# Patient Record
Sex: MALE | Race: BLACK OR AFRICAN AMERICAN | URBAN - METROPOLITAN AREA
[De-identification: names, ages, dates, MRNs, and addresses within clinical notes are randomized per-mention and may not be internally consistent; named-entity substitution may affect disease eponyms.]

---

## 2018-01-16 ENCOUNTER — RECORDS - HEALTHEAST (OUTPATIENT)
Dept: LAB | Facility: CLINIC | Age: 48
End: 2018-01-16

## 2018-01-16 LAB
ALBUMIN SERPL-MCNC: 3.9 G/DL (ref 3.5–5)
ALP SERPL-CCNC: 66 U/L (ref 45–120)
ALT SERPL W P-5'-P-CCNC: 23 U/L (ref 0–45)
ANION GAP SERPL CALCULATED.3IONS-SCNC: 12 MMOL/L (ref 5–18)
AST SERPL W P-5'-P-CCNC: 24 U/L (ref 0–40)
BILIRUB SERPL-MCNC: 0.7 MG/DL (ref 0–1)
BUN SERPL-MCNC: 11 MG/DL (ref 8–22)
CALCIUM SERPL-MCNC: 10.5 MG/DL (ref 8.5–10.5)
CHLORIDE BLD-SCNC: 106 MMOL/L (ref 98–107)
CO2 SERPL-SCNC: 25 MMOL/L (ref 22–31)
CREAT SERPL-MCNC: 0.56 MG/DL (ref 0.7–1.3)
ERYTHROCYTE [DISTWIDTH] IN BLOOD BY AUTOMATED COUNT: 15.9 % (ref 11–14.5)
GFR SERPL CREATININE-BSD FRML MDRD: >60 ML/MIN/1.73M2
GLUCOSE BLD-MCNC: 76 MG/DL (ref 70–125)
HCT VFR BLD AUTO: 43.8 % (ref 40–54)
HGB BLD-MCNC: 14.6 G/DL (ref 14–18)
MCH RBC QN AUTO: 28 PG (ref 27–34)
MCHC RBC AUTO-ENTMCNC: 33.3 G/DL (ref 32–36)
MCV RBC AUTO: 84 FL (ref 80–100)
PLATELET # BLD AUTO: 318 THOU/UL (ref 140–440)
PMV BLD AUTO: 11.4 FL (ref 8.5–12.5)
POTASSIUM BLD-SCNC: 3.8 MMOL/L (ref 3.5–5)
PROT SERPL-MCNC: 7.8 G/DL (ref 6–8)
RBC # BLD AUTO: 5.21 MILL/UL (ref 4.4–6.2)
SODIUM SERPL-SCNC: 143 MMOL/L (ref 136–145)
TSH SERPL DL<=0.005 MIU/L-ACNC: 0.76 UIU/ML (ref 0.3–5)
WBC: 7.4 THOU/UL (ref 4–11)

## 2018-01-17 LAB — 25(OH)D3 SERPL-MCNC: 15.5 NG/ML (ref 30–80)

## 2018-03-30 ENCOUNTER — HOSPITAL ENCOUNTER (INPATIENT)
Facility: CLINIC | Age: 48
LOS: 17 days | Discharge: HOME | DRG: 885 | End: 2018-04-18
Attending: EMERGENCY MEDICINE | Admitting: PSYCHIATRY & NEUROLOGY
Payer: COMMERCIAL

## 2018-03-30 ENCOUNTER — MEDICAL CORRESPONDENCE (OUTPATIENT)
Dept: HEALTH INFORMATION MANAGEMENT | Facility: CLINIC | Age: 48
End: 2018-03-30

## 2018-03-30 DIAGNOSIS — G47.00 INSOMNIA, UNSPECIFIED TYPE: ICD-10-CM

## 2018-03-30 DIAGNOSIS — R46.89 AGGRESSIVE BEHAVIOR: ICD-10-CM

## 2018-03-30 DIAGNOSIS — M06.9 RHEUMATOID ARTHRITIS, INVOLVING UNSPECIFIED SITE, UNSPECIFIED RHEUMATOID FACTOR PRESENCE: ICD-10-CM

## 2018-03-30 DIAGNOSIS — K59.00 CONSTIPATION, UNSPECIFIED CONSTIPATION TYPE: ICD-10-CM

## 2018-03-30 DIAGNOSIS — F29 PSYCHOSIS, UNSPECIFIED PSYCHOSIS TYPE (H): ICD-10-CM

## 2018-03-30 DIAGNOSIS — I95.9 HYPOTENSION, UNSPECIFIED HYPOTENSION TYPE: ICD-10-CM

## 2018-03-30 DIAGNOSIS — F31.81 BIPOLAR 2 DISORDER (H): Primary | ICD-10-CM

## 2018-03-30 DIAGNOSIS — R32 URINARY INCONTINENCE, UNSPECIFIED TYPE: ICD-10-CM

## 2018-03-30 LAB
ALBUMIN SERPL-MCNC: 4.1 G/DL (ref 3.4–5)
ALP SERPL-CCNC: 60 U/L (ref 40–150)
ALT SERPL W P-5'-P-CCNC: 22 U/L (ref 0–70)
ANION GAP SERPL CALCULATED.3IONS-SCNC: 6 MMOL/L (ref 3–14)
AST SERPL W P-5'-P-CCNC: 10 U/L (ref 0–45)
BASOPHILS # BLD AUTO: 0 10E9/L (ref 0–0.2)
BASOPHILS NFR BLD AUTO: 0.2 %
BILIRUB SERPL-MCNC: 0.4 MG/DL (ref 0.2–1.3)
BUN SERPL-MCNC: 15 MG/DL (ref 7–30)
CALCIUM SERPL-MCNC: 9.4 MG/DL (ref 8.5–10.1)
CHLORIDE SERPL-SCNC: 110 MMOL/L (ref 94–109)
CO2 SERPL-SCNC: 26 MMOL/L (ref 20–32)
CREAT SERPL-MCNC: 0.35 MG/DL (ref 0.66–1.25)
DIFFERENTIAL METHOD BLD: NORMAL
EOSINOPHIL # BLD AUTO: 0 10E9/L (ref 0–0.7)
EOSINOPHIL NFR BLD AUTO: 0.8 %
ERYTHROCYTE [DISTWIDTH] IN BLOOD BY AUTOMATED COUNT: 14.6 % (ref 10–15)
GFR SERPL CREATININE-BSD FRML MDRD: >90 ML/MIN/1.7M2
GLUCOSE SERPL-MCNC: 92 MG/DL (ref 70–99)
HCT VFR BLD AUTO: 41.3 % (ref 40–53)
HGB BLD-MCNC: 14.2 G/DL (ref 13.3–17.7)
IMM GRANULOCYTES # BLD: 0 10E9/L (ref 0–0.4)
IMM GRANULOCYTES NFR BLD: 0.2 %
LYMPHOCYTES # BLD AUTO: 1.5 10E9/L (ref 0.8–5.3)
LYMPHOCYTES NFR BLD AUTO: 30.1 %
MCH RBC QN AUTO: 29.2 PG (ref 26.5–33)
MCHC RBC AUTO-ENTMCNC: 34.4 G/DL (ref 31.5–36.5)
MCV RBC AUTO: 85 FL (ref 78–100)
MONOCYTES # BLD AUTO: 0.4 10E9/L (ref 0–1.3)
MONOCYTES NFR BLD AUTO: 7.8 %
NEUTROPHILS # BLD AUTO: 3 10E9/L (ref 1.6–8.3)
NEUTROPHILS NFR BLD AUTO: 60.9 %
NRBC # BLD AUTO: 0 10*3/UL
NRBC BLD AUTO-RTO: 0 /100
PLATELET # BLD AUTO: 225 10E9/L (ref 150–450)
POTASSIUM SERPL-SCNC: 3.9 MMOL/L (ref 3.4–5.3)
PROT SERPL-MCNC: 7.7 G/DL (ref 6.8–8.8)
RBC # BLD AUTO: 4.87 10E12/L (ref 4.4–5.9)
SODIUM SERPL-SCNC: 142 MMOL/L (ref 133–144)
TSH SERPL DL<=0.005 MIU/L-ACNC: 0.79 MU/L (ref 0.4–4)
WBC # BLD AUTO: 4.9 10E9/L (ref 4–11)

## 2018-03-30 PROCEDURE — 84443 ASSAY THYROID STIM HORMONE: CPT | Performed by: EMERGENCY MEDICINE

## 2018-03-30 PROCEDURE — 96360 HYDRATION IV INFUSION INIT: CPT | Performed by: EMERGENCY MEDICINE

## 2018-03-30 PROCEDURE — 25000132 ZZH RX MED GY IP 250 OP 250 PS 637: Performed by: EMERGENCY MEDICINE

## 2018-03-30 PROCEDURE — 80053 COMPREHEN METABOLIC PANEL: CPT | Performed by: EMERGENCY MEDICINE

## 2018-03-30 PROCEDURE — 90791 PSYCH DIAGNOSTIC EVALUATION: CPT

## 2018-03-30 PROCEDURE — 99285 EMERGENCY DEPT VISIT HI MDM: CPT | Mod: 25 | Performed by: EMERGENCY MEDICINE

## 2018-03-30 PROCEDURE — 85025 COMPLETE CBC W/AUTO DIFF WBC: CPT | Performed by: EMERGENCY MEDICINE

## 2018-03-30 PROCEDURE — 99285 EMERGENCY DEPT VISIT HI MDM: CPT | Mod: Z6 | Performed by: EMERGENCY MEDICINE

## 2018-03-30 PROCEDURE — 25000128 H RX IP 250 OP 636: Performed by: EMERGENCY MEDICINE

## 2018-03-30 RX ORDER — TAMSULOSIN HYDROCHLORIDE 0.4 MG/1
0.4 CAPSULE ORAL DAILY
Status: ON HOLD | COMMUNITY
End: 2018-04-17

## 2018-03-30 RX ORDER — FUROSEMIDE 20 MG/1
10 TABLET ORAL
Status: DISCONTINUED | OUTPATIENT
Start: 2018-04-02 | End: 2018-04-18 | Stop reason: HOSPADM

## 2018-03-30 RX ORDER — TAMSULOSIN HYDROCHLORIDE 0.4 MG/1
0.4 CAPSULE ORAL DAILY
Status: DISCONTINUED | OUTPATIENT
Start: 2018-03-31 | End: 2018-04-08

## 2018-03-30 RX ORDER — TRAZODONE HYDROCHLORIDE 100 MG/1
100 TABLET ORAL AT BEDTIME
Status: DISCONTINUED | OUTPATIENT
Start: 2018-03-30 | End: 2018-04-01

## 2018-03-30 RX ORDER — BISACODYL 5 MG/1
5 TABLET, DELAYED RELEASE ORAL DAILY PRN
Status: ON HOLD | COMMUNITY
End: 2018-04-17

## 2018-03-30 RX ORDER — AMOXICILLIN 250 MG
2 CAPSULE ORAL 2 TIMES DAILY
Status: ON HOLD | COMMUNITY
End: 2018-04-18

## 2018-03-30 RX ORDER — OLANZAPINE 10 MG/1
10 TABLET, ORALLY DISINTEGRATING ORAL ONCE
Status: COMPLETED | OUTPATIENT
Start: 2018-03-30 | End: 2018-03-30

## 2018-03-30 RX ORDER — POLYETHYLENE GLYCOL 3350 17 G/17G
17 POWDER, FOR SOLUTION ORAL 2 TIMES DAILY
Status: ON HOLD | COMMUNITY
End: 2018-04-17

## 2018-03-30 RX ORDER — SULFASALAZINE 500 MG/1
1000 TABLET ORAL 2 TIMES DAILY
Status: DISCONTINUED | OUTPATIENT
Start: 2018-03-30 | End: 2018-04-08

## 2018-03-30 RX ORDER — SENNOSIDES 8.6 MG
2 TABLET ORAL 2 TIMES DAILY
Status: DISCONTINUED | OUTPATIENT
Start: 2018-03-30 | End: 2018-04-04

## 2018-03-30 RX ORDER — ERGOCALCIFEROL 1.25 MG/1
50000 CAPSULE, LIQUID FILLED ORAL
Status: ON HOLD | COMMUNITY
End: 2018-04-18

## 2018-03-30 RX ORDER — SULFASALAZINE 500 MG/1
1000 TABLET ORAL 2 TIMES DAILY
Status: ON HOLD | COMMUNITY
End: 2018-04-17

## 2018-03-30 RX ORDER — OLANZAPINE 20 MG/1
20 TABLET, ORALLY DISINTEGRATING ORAL AT BEDTIME
Status: DISCONTINUED | OUTPATIENT
Start: 2018-03-30 | End: 2018-04-04

## 2018-03-30 RX ORDER — MULTIPLE VITAMINS W/ MINERALS TAB 9MG-400MCG
1 TAB ORAL ONCE
Status: DISCONTINUED | OUTPATIENT
Start: 2018-03-30 | End: 2018-04-18 | Stop reason: HOSPADM

## 2018-03-30 RX ADMIN — OLANZAPINE 10 MG: 10 TABLET, ORALLY DISINTEGRATING ORAL at 11:57

## 2018-03-30 RX ADMIN — SODIUM CHLORIDE 1000 ML: 9 INJECTION, SOLUTION INTRAVENOUS at 16:04

## 2018-03-30 ASSESSMENT — ENCOUNTER SYMPTOMS
SLEEP DISTURBANCE: 1
AGITATION: 1
HYPERACTIVE: 1

## 2018-03-30 NOTE — PHARMACY-ADMISSION MEDICATION HISTORY
Admission medication history interview status for the 3/30/2018 admission is complete. See Epic admission navigator for allergy information, pharmacy, prior to admission medications and immunization status.     Medication history interview sources:  MAR from [facility] [phone]    Changes made to PTA medication list (reason)  Added: all meds listed below  Deleted: none  Changed: none    Additional medication history information (including reliability of information, actions taken by pharmacist):  -Updated medication list from MAR provided by care facility.   -Unsure why patient has not had the Lasix for the past week. The order was not discontinued on the MAR.     Prior to Admission medications    Medication Sig Last Dose Taking? Auth Provider   TRAZODONE HCL PO Take 100 mg by mouth At Bedtime 3/29/2018 at 2000 Yes Unknown, Entered By History   polyethylene glycol (MIRALAX/GLYCOLAX) Packet Take 17 g by mouth 2 times daily 3/30/2018 at 0800 Yes Unknown, Entered By History   senna-docusate (SENOKOT-S;PERICOLACE) 8.6-50 MG per tablet Take 2 tablets by mouth 2 times daily 3/30/2018 at 0800 Yes Unknown, Entered By History   Acetaminophen (TYLENOL PO) Take 650 mg by mouth every 4 hours as needed for mild pain or fever 3/28/2018 at 0514 Yes Unknown, Entered By History   bisacodyl (DULCOLAX) 5 MG EC tablet Take 5 mg by mouth daily as needed for constipation 3/29/2018 at 2209 Yes Unknown, Entered By History   Furosemide (LASIX PO) Take 10 mg by mouth every 48 hours 3/22/2018 at 2347 Yes Unknown, Entered By History   magnesium citrate solution Take 296 mLs by mouth daily as needed for constipation 3/28/2018 at 0515 Yes Unknown, Entered By History   magnesium hydroxide (MILK OF MAGNESIA) 400 MG/5ML suspension Take 30 mLs by mouth daily as needed for constipation 3/28/2018 at 0515 Yes Unknown, Entered By History   OXYCODONE HCL PO Take 5 mg by mouth every 6 hours as needed (pain) 3/28/2018 at 0515 Yes Unknown,  Entered By History   Multiple Vitamins-Minerals (CERTAVITE/ANTIOXIDANTS PO) Take 1 tablet by mouth daily 3/30/2018 at 0800 Yes Unknown, Entered By History   Psyllium (METAMUCIL FIBER SINGLES PO) Take 17 g by mouth daily 3/30/2018 at 0800 Yes Unknown, Entered By History   OLANZAPINE PO Take 20 mg by mouth daily 3/30/2018 at 0800 Yes Unknown, Entered By History   tamsulosin (FLOMAX) 0.4 MG capsule Take 0.4 mg by mouth daily 3/30/2018 at 0800 Yes Unknown, Entered By History   sulfaSALAzine (AZULFIDINE) 500 MG tablet Take 1,000 mg by mouth 2 times daily 3/30/2018 at 0800 Yes Unknown, Entered By History   vitamin D (ERGOCALCIFEROL) 06119 UNIT capsule Take 50,000 Units by mouth Twice weekly on Tuesdays and Saturdays 3/27/2018 at 0800 Yes Unknown, Entered By History     Medication history completed by:   Madeline Bullock, PharmD, BCPS

## 2018-03-30 NOTE — IP AVS SNAPSHOT
MRN:[id number]                      After Visit Summary   3/30/2018    Ronald Dalal    MRN: [id number]           Thank you!     Thank you for choosing [facility] for your care. Our goal is always to provide you with excellent care.        Patient Information     Date Of Birth          1970        Designated Caregiver       Most Recent Value    Caregiver    Name of designated caregiver ?    Phone number of caregiver ?    Caregiver address ?      About your hospital stay     You were admitted on:  April 1, 2018 [facility]    You were discharged on:  April 18, 2018       Who to Call     For medical emergencies, please call 911.  For non-urgent questions about your medical care, please call your primary care provider or clinic, None          Attending Provider     Provider Specialty    Brody Rothman MD Emergency Medicine    Kristian, Demetrius MADRIGAL MD Emergency Medicine    Wale, Coreen Butler MD Emergency Medicine    Inga, Fran Lowe MD Emergency Medicine    Brian, Mt Hanna MD Psychiatry       Primary Care Provider    Rachana Sewell MD      Further instructions from your care team        Behavioral Discharge Planning and Instructions      Summary:  You were admitted on 3/30/2018  due to aggression.  You were treated by Dr. Mt Nayak MD and discharged on 04/18/2018 from [address] to niece's house.    [facility] [place]  [address]  [address]  [place]  [phone]      Principal Diagnosis:   Bipolar disorder Type 1 versus 2  Developmental delay    Health Care Follow-up Appointments:   PCP:  Dr Linda Izquierdo, April 24th at 1:20 pm  [address],   [place] [address]   Phone: [phone]    :  [facility]  [phone]  [facility] Coordinator:  Shari Mckee  [phone]  Attend all scheduled appointments with your outpatient providers. Call at least 24 hours in advance if you need to  reschedule an appointment to ensure continued access to your outpatient providers.   Major Treatments, Procedures and Findings:  You were provided with: a psychiatric assessment, assessed for medical stability and medication evaluation and/or management    Symptoms to Report: feeling more aggressive, increased confusion, losing more sleep, mood getting worse or thoughts of suicide    Early warning signs can include: increased depression or anxiety sleep disturbances increased thoughts or behaviors of suicide or self-harm  increased unusual thinking, such as paranoia or hearing voices    Safety and Wellness:  Take all medicines as directed.  Make no changes unless your doctor suggests them.      Follow treatment recommendations.  Refrain from alcohol and non-prescribed drugs.  If there is a concern for safety, call 911.    Resources:   Crisis Intervention: 358.521.2529 or 041-260-4103 (TTY: 159.436.3632).  Call anytime for help.  [place] on Mental Illness (www.[place].radha.org): 845.498.8216 or 437-027-9721.  Suicide Awareness Voices of Education (SAVE) (www.save.org): 500-879-SLMY (1594)  National Suicide Prevention Line (www.mentalhealthmn.org): 347-174-CSHE (4784)  Mental Health Consumer/Survivor Network of [place] (www.mhcsn.net): 384.679.5964 or 374-760-2846  Mental Health Association of [place] (www.mentalhealth.org): 808.193.4251 or 341-336-4967  Self- Management and Recovery Training., SMART-- Toll free: 530.466.5470  www.[employer].org  [facility] Crisis (COPE) Response - Adult [phone]    The treatment team has appreciated the opportunity to work with you.     If you have any questions or concerns our unit number is [phone].      Pending Results     Date and Time Order Name Status Description    4/17/2018 1604 EKG 12-lead, complete Preliminary             Statement of Approval     Ordered          04/18/18 0916  I have reviewed and agree with all the recommendations and orders detailed in this  "document.  EFFECTIVE NOW     Approved and electronically signed by:  Mt Torres MD             Admission Information     Date & Time Provider Department Dept. Phone    3/30/2018 Mt Torres MD [address] [phone]      Your Vitals Were     Blood Pressure Pulse Temperature Respirations Weight Pulse Oximetry    120/82 100 96.6  F (35.9  C) (Tympanic) 15 65.5 kg (144 lb 4.8 oz) 98%      MyChart Information     [employer] lets you send messages to your doctor, view your test results, renew your prescriptions, schedule appointments and more. To sign up, go to [email or web address] . Click on \"Log in\" on the left side of the screen, which will take you to the Welcome page. Then click on \"Sign up Now\" on the right side of the page.     You will be asked to enter the access code listed below, as well as some personal information. Please follow the directions to create your username and password.     Your access code is: [email or web address]  Expires: 2018 12:57 PM     Your access code will  in 90 days. If you need help or a new code, please call your [facility] or [phone].        Care EveryWhere ID     This is your Care EveryWhere ID. This could be used by other organizations to access your [facility] medical records  [id number]        Equal Access to Services     [facility] AH: Hadii vero major hadhaleyo Sosaraali, waaxda luqadaha, qaybta kaalmada aderichardyada, daya butler . So [facility] [phone].    ATENCIÓN: Si habla español, tiene a quiñones disposición servicios gratuitos de asistencia lingüística. Llame al [phone].    We comply with applicable federal civil rights laws and [place] laws. We do not discriminate on the basis of race, color, national origin, age, disability, sex, sexual orientation, or gender identity.               Review of your medicines      START taking        Dose / Directions    gabapentin 250 MG/5ML solution   Commonly known as:  NEURONTIN "   Used for:  Bipolar 2 disorder (H)        Dose:  200 mg   Take 4 mLs (200 mg) by mouth 3 times daily   Quantity:  470 mL   Refills:  3       OLANZapine 1 mg/ml suspension   Commonly known as:  zyPREXA   Replaces:  OLANZAPINE PO        Dose:  20 mg   Take 20 mLs (20 mg) by mouth At Bedtime   Quantity:  600 mL   Refills:  3       sennosides 8.8 MG/5ML syrup   Commonly known as:  SENOKOT   Used for:  Constipation, unspecified constipation type        Dose:  10 mL   Take 10 mLs by mouth daily as needed for constipation   Quantity:  236 mL   Refills:  0       sulfaSALAzine 50 mg/mL Susp   Commonly known as:  AZULFIDINE   Used for:  Rheumatoid arthritis, involving unspecified site, unspecified rheumatoid factor presence (H)   Replaces:  sulfaSALAzine 500 MG tablet        Dose:  1000 mg   Take 20 mLs (1,000 mg) by mouth 2 times daily   Quantity:  1200 mL   Refills:  1       tamsulosin 80 mcg/mL Susp   Commonly known as:  FLOMAX   Used for:  Urinary incontinence, unspecified type   Replaces:  FLOMAX 0.4 MG capsule        Dose:  400 mcg   Take 5 mLs (400 mcg) by mouth daily   Quantity:  200 mL   Refills:  1       valproic acid 250 MG/5ML Soln syrup   Commonly known as:  DEPAKENE   Used for:  Bipolar 2 disorder (H)        Dose:  500 mg   Take 10 mLs (500 mg) by mouth At Bedtime   Quantity:  473 mL   Refills:  3         CONTINUE these medicines which may have CHANGED, or have new prescriptions. If we are uncertain of the size of tablets/capsules you have at home, strength may be listed as something that might have changed.        Dose / Directions    furosemide 20 MG tablet   Commonly known as:  LASIX   This may have changed:  when to take this   Used for:  Urinary incontinence, unspecified type        Dose:  10 mg   Start taking on:  4/20/2018   Take 0.5 tablets (10 mg) by mouth Every Mon, Wed, Fri Morning   Quantity:  15 tablet   Refills:  0         CONTINUE these medicines which have NOT CHANGED        Dose / Directions     TYLENOL PO        Dose:  650 mg   Take 650 mg by mouth every 4 hours as needed for mild pain or fever   Refills:  0         STOP taking     bisacodyl 5 MG EC tablet   Commonly known as:  DULCOLAX           CERTAVITE/ANTIOXIDANTS PO           FLOMAX 0.4 MG capsule   Generic drug:  tamsulosin   Replaced by:  tamsulosin 80 mcg/mL Susp           magnesium citrate solution           magnesium hydroxide 400 MG/5ML suspension   Commonly known as:  MILK OF MAGNESIA           METAMUCIL FIBER SINGLES PO           OLANZAPINE PO   Replaced by:  OLANZapine 1 mg/ml suspension           OXYCODONE HCL PO           polyethylene glycol Packet   Commonly known as:  MIRALAX/GLYCOLAX           senna-docusate 8.6-50 MG per tablet   Commonly known as:  SENOKOT-S;PERICOLACE           sulfaSALAzine 500 MG tablet   Commonly known as:  AZULFIDINE   Replaced by:  sulfaSALAzine 50 mg/mL Susp           TRAZODONE HCL PO           vitamin D 50795 UNIT capsule   Commonly known as:  ERGOCALCIFEROL                Where to get your medicines      These medications were sent to [facility] [place], [place] - [address]  [address] [address]     Phone:  [phone]     furosemide 20 MG tablet    gabapentin 250 MG/5ML solution    OLANZapine 1 mg/ml suspension    sennosides 8.8 MG/5ML syrup    sulfaSALAzine 50 mg/mL Susp    tamsulosin 80 mcg/mL Susp    valproic acid 250 MG/5ML Soln syrup                Protect others around you: Learn how to safely use, store and throw away your medicines at www.disposemymeds.org.             Medication List: This is a list of all your medications and when to take them. Check marks below indicate your daily home schedule. Keep this list as a reference.      Medications           Morning Afternoon Evening Bedtime As Needed    furosemide 20 MG tablet   Commonly known as:  LASIX   Take 0.5 tablets (10 mg) by mouth Every Mon, Wed, Fri Morning   Start taking on:  4/20/2018   Last  time this was given:  10 mg on 4/18/2018  9:18 AM            Monday, Wednesday and Friday                       gabapentin 250 MG/5ML solution   Commonly known as:  NEURONTIN   Take 4 mLs (200 mg) by mouth 3 times daily   Last time this was given:  200 mg on 4/18/2018  9:19 AM                   2PM                      OLANZapine 1 mg/ml suspension   Commonly known as:  zyPREXA   Take 20 mLs (20 mg) by mouth At Bedtime   Last time this was given:  20 mg on 4/17/2018  7:53 PM                                   sennosides 8.8 MG/5ML syrup   Commonly known as:  SENOKOT   Take 10 mLs by mouth daily as needed for constipation   Last time this was given:  10 mLs on 4/17/2018  8:37 AM                            Daily for constipation.       sulfaSALAzine 50 mg/mL Susp   Commonly known as:  AZULFIDINE   Take 20 mLs (1,000 mg) by mouth 2 times daily   Last time this was given:  1,000 mg on 4/18/2018  9:19 AM                                      tamsulosin 80 mcg/mL Susp   Commonly known as:  FLOMAX   Take 5 mLs (400 mcg) by mouth daily   Last time this was given:  400 mcg on 4/18/2018  9:19 AM                                   TYLENOL PO   Take 650 mg by mouth every 4 hours as needed for mild pain or fever                                   valproic acid 250 MG/5ML Soln syrup   Commonly known as:  DEPAKENE   Take 10 mLs (500 mg) by mouth At Bedtime   Last time this was given:  500 mg on 4/17/2018  7:53 PM

## 2018-03-30 NOTE — IP AVS SNAPSHOT
[facility]    [address]    [place] [address]    Phone:  [phone]                                       After Visit Summary   3/30/2018    Ronald Dalal    MRN: [id number]           After Visit Summary Signature Page     I have received my discharge instructions, and my questions have been answered. I have discussed any challenges I see with this plan with the nurse or doctor.    ..........................................................................................................................................  Patient/Patient Representative Signature      ..........................................................................................................................................  Patient Representative Print Name and Relationship to Patient    ..................................................               ................................................  Date                                            Time    ..........................................................................................................................................  Reviewed by Signature/Title    ...................................................              ..............................................  Date                                                            Time

## 2018-03-30 NOTE — ED NOTES
ED to Behavioral Floor Handoff    SITUATION  Ronald Dalal is a 47 year old male who speaks Data Unavailable and lives in a nursing home with others The patient arrived in the ED by ambulance from emergency room with a complaint of Aggressive Behavior (is aggressive with other residents at [place])  .The patient's current symptoms started/worsened 3 day(s) ago and during this time the symptoms have   .   In the ED, pt was diagnosed with   Final diagnoses:   Psychosis, unspecified psychosis type   Aggressive behavior        Initial vitals were: BP: 136/72  Pulse: 114  Temp: 97.6  F (36.4  C)  Resp: 16  SpO2: 98 %   --------  Is the patient diabetic? No   If yes, last blood glucose? --     If yes, was this treated in the ED? --  --------  Is the patient inebriated (ETOH) No or Impaired on other substances? No  MSSA done? N/A  Last MSSA score: --    Were withdrawal symptoms treated? N/A  Does the patient have a seizure history? No. If yes, date of most recent seizure--  --------  Is the patient patient experiencing suicidal ideation? denies current or recent suicidal ideation     Homicidal ideation? reports current or recent homicidal ideation or behaviors including aggressive towards others    Self-injurious behavior/urges? denies current or recent self injurious behavior or ideation.  ------  Was pt aggressive in the ED Yes  Was a code called Yes  Is the pt now cooperative? Yes  -------  Meds given in ED:   Medications   0.9% sodium chloride BOLUS (1,000 mLs Intravenous New Bag 3/30/18 1604)   OLANZapine zydis (zyPREXA) ODT tab 10 mg (10 mg Oral Given 3/30/18 1157)      Family present during ED course? No  Family currently present? No    BACKGROUND  Does the patient have a cognitive impairment or developmental disability? Yes  Allergies: No Known Allergies.   Social demographics are   Social History     Social History     Marital status: N/A     Spouse name: N/A     Number of children: N/A     Years of  education: N/A     Social History Main Topics     Smoking status: Not on file     Smokeless tobacco: Not on file     Alcohol use Not on file     Drug use: Not on file     Sexual activity: Not on file     Other Topics Concern     Not on file     Social History Narrative        ASSESSMENT  Labs results   Labs Ordered and Resulted from Time of ED Arrival Up to the Time of Departure from the ED   COMPREHENSIVE METABOLIC PANEL - Abnormal; Notable for the following:        Result Value    Chloride 110 (*)     Creatinine 0.35 (*)     All other components within normal limits   CBC WITH PLATELETS DIFFERENTIAL   TSH WITH FREE T4 REFLEX   DRUG ABUSE SCREEN 6 CHEM DEP URINE ([facility])      Imaging Studies: No results found for this or any previous visit (from the past 24 hour(s)).   Most recent vital signs BP (!) 87/60  Pulse 78  Temp 97.6  F (36.4  C) (Oral)  Resp 16  SpO2 98%   Abnormal labs/tests/findings requiring intervention:---   Pain control: pt had none  Nausea control: pt had none    RECOMMENDATION  Are any infection precautions needed (MRSA, VRE, etc.)? No If yes, what infection? --  ---  Does the patient have mobility issues? independently. If yes, what device does the pt use? ---  ---  Is patient on 72 hour hold or commitment? Yes If on 72 hour hold, have hold and rights been given to patient? Yes  Are admitting orders written if after 10 p.m. ?No  Tasks needing to be completed:---     Morales Serrano   ascom-- [id number]  Pt is currently sedated, receiving IV fluids, needs assist with toilet-wears adult pull up   [phone] [facility]   [phone] [facility]
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
## 2018-03-30 NOTE — ED NOTES
Bed: ED10  Expected date: 3/30/18  Expected time: 10:40 AM  Means of arrival:   Comments:  [id number] 48yo M behav

## 2018-03-31 PROCEDURE — 25000132 ZZH RX MED GY IP 250 OP 250 PS 637: Performed by: INTERNAL MEDICINE

## 2018-03-31 RX ADMIN — SENNOSIDES 2 TABLET: 8.6 TABLET, FILM COATED ORAL at 20:00

## 2018-03-31 RX ADMIN — OLANZAPINE 20 MG: 20 TABLET, ORALLY DISINTEGRATING ORAL at 22:11

## 2018-03-31 RX ADMIN — SULFASALAZINE 1000 MG: 500 TABLET ORAL at 19:59

## 2018-03-31 RX ADMIN — TRAZODONE HYDROCHLORIDE 100 MG: 100 TABLET ORAL at 22:11

## 2018-03-31 RX ADMIN — SULFASALAZINE 500 MG: 500 TABLET ORAL at 10:33

## 2018-03-31 NOTE — ED NOTES
4:00 PM  Patient was signed out to me pending bed placement.  He slept most of the day and had no complaints.  At the time of this dictation, we were still awaiting bed placement on mental health for his psychosis.     Demetrius Townsend MD  03/31/18 7874

## 2018-03-31 NOTE — ED NOTES
No bed available tonight.  Holding orders for medications and diet written.  BP 91/53  Pulse 78  Temp 97.6  F (36.4  C) (Oral)  Resp 16  SpO2 98%  Labs/Imaging    Results for orders placed or performed during the hospital encounter of 03/30/18 (from the past 24 hour(s))   CBC with platelets differential   Result Value Ref Range    WBC 4.9 4.0 - 11.0 10e9/L    RBC Count 4.87 4.4 - 5.9 10e12/L    Hemoglobin 14.2 13.3 - 17.7 g/dL    Hematocrit 41.3 40.0 - 53.0 %    MCV 85 78 - 100 fl    MCH 29.2 26.5 - 33.0 pg    MCHC 34.4 31.5 - 36.5 g/dL    RDW 14.6 10.0 - 15.0 %    Platelet Count 225 150 - 450 10e9/L    Diff Method Automated Method     % Neutrophils 60.9 %    % Lymphocytes 30.1 %    % Monocytes 7.8 %    % Eosinophils 0.8 %    % Basophils 0.2 %    % Immature Granulocytes 0.2 %    Nucleated RBCs 0 0 /100    Absolute Neutrophil 3.0 1.6 - 8.3 10e9/L    Absolute Lymphocytes 1.5 0.8 - 5.3 10e9/L    Absolute Monocytes 0.4 0.0 - 1.3 10e9/L    Absolute Eosinophils 0.0 0.0 - 0.7 10e9/L    Absolute Basophils 0.0 0.0 - 0.2 10e9/L    Abs Immature Granulocytes 0.0 0 - 0.4 10e9/L    Absolute Nucleated RBC 0.0    Comprehensive metabolic panel   Result Value Ref Range    Sodium 142 133 - 144 mmol/L    Potassium 3.9 3.4 - 5.3 mmol/L    Chloride 110 (H) 94 - 109 mmol/L    Carbon Dioxide 26 20 - 32 mmol/L    Anion Gap 6 3 - 14 mmol/L    Glucose 92 70 - 99 mg/dL    Urea Nitrogen 15 7 - 30 mg/dL    Creatinine 0.35 (L) 0.66 - 1.25 mg/dL    GFR Estimate >90 >60 mL/min/1.7m2    GFR Estimate If Black >90 >60 mL/min/1.7m2    Calcium 9.4 8.5 - 10.1 mg/dL    Bilirubin Total 0.4 0.2 - 1.3 mg/dL    Albumin 4.1 3.4 - 5.0 g/dL    Protein Total 7.7 6.8 - 8.8 g/dL    Alkaline Phosphatase 60 40 - 150 U/L    ALT 22 0 - 70 U/L    AST 10 0 - 45 U/L   TSH with free T4 reflex   Result Value Ref Range    TSH 0.79 0.40 - 4.00 mU/L          Brendan Berger MD  03/30/18 7369
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
## 2018-04-01 PROBLEM — F29 PSYCHOSIS (H): Status: ACTIVE | Noted: 2018-04-01

## 2018-04-01 LAB
AMPHETAMINES UR QL SCN: NEGATIVE
BARBITURATES UR QL: NEGATIVE
BENZODIAZ UR QL: NEGATIVE
CANNABINOIDS UR QL SCN: NEGATIVE
COCAINE UR QL: NEGATIVE
ETHANOL UR QL SCN: NEGATIVE
OPIATES UR QL SCN: NEGATIVE

## 2018-04-01 PROCEDURE — 80307 DRUG TEST PRSMV CHEM ANLYZR: CPT | Performed by: EMERGENCY MEDICINE

## 2018-04-01 PROCEDURE — 80320 DRUG SCREEN QUANTALCOHOLS: CPT | Performed by: EMERGENCY MEDICINE

## 2018-04-01 PROCEDURE — 25000132 ZZH RX MED GY IP 250 OP 250 PS 637: Performed by: EMERGENCY MEDICINE

## 2018-04-01 PROCEDURE — 12400005 ZZH R&B MH CRITICAL SENIOR/ADOLESCENT

## 2018-04-01 PROCEDURE — 25000132 ZZH RX MED GY IP 250 OP 250 PS 637: Performed by: PSYCHIATRY & NEUROLOGY

## 2018-04-01 PROCEDURE — 25000132 ZZH RX MED GY IP 250 OP 250 PS 637: Performed by: INTERNAL MEDICINE

## 2018-04-01 RX ORDER — GABAPENTIN 250 MG/5ML
200 SOLUTION ORAL 3 TIMES DAILY
Status: DISCONTINUED | OUTPATIENT
Start: 2018-04-01 | End: 2018-04-18 | Stop reason: HOSPADM

## 2018-04-01 RX ORDER — OLANZAPINE 10 MG/1
10 TABLET ORAL
Status: DISCONTINUED | OUTPATIENT
Start: 2018-04-01 | End: 2018-04-04

## 2018-04-01 RX ORDER — ACETAMINOPHEN 325 MG/1
650 TABLET ORAL EVERY 4 HOURS PRN
Status: DISCONTINUED | OUTPATIENT
Start: 2018-04-01 | End: 2018-04-08

## 2018-04-01 RX ORDER — OLANZAPINE 10 MG/2ML
10 INJECTION, POWDER, FOR SOLUTION INTRAMUSCULAR
Status: DISCONTINUED | OUTPATIENT
Start: 2018-04-01 | End: 2018-04-18 | Stop reason: HOSPADM

## 2018-04-01 RX ORDER — HYDROXYZINE HYDROCHLORIDE 25 MG/1
25 TABLET, FILM COATED ORAL EVERY 4 HOURS PRN
Status: DISCONTINUED | OUTPATIENT
Start: 2018-04-01 | End: 2018-04-08

## 2018-04-01 RX ADMIN — OLANZAPINE 20 MG: 20 TABLET, ORALLY DISINTEGRATING ORAL at 21:07

## 2018-04-01 RX ADMIN — VALPROIC ACID 500 MG: 250 SOLUTION ORAL at 22:03

## 2018-04-01 RX ADMIN — OLANZAPINE 10 MG: 10 TABLET, FILM COATED ORAL at 08:26

## 2018-04-01 RX ADMIN — GABAPENTIN 200 MG: 250 SUSPENSION ORAL at 21:41

## 2018-04-01 RX ADMIN — GABAPENTIN 200 MG: 250 SUSPENSION ORAL at 13:39

## 2018-04-01 ASSESSMENT — ACTIVITIES OF DAILY LIVING (ADL)
SWALLOWING: 0 - SWALLOWS FOODS/LIQUIDS WITHOUT DIFFICULTY
RETIRED_COMMUNICATION: 3-->UNABLE TO UNDERSTAND OR SPEAK (NOT RELATED TO LANGUAGE BARRIER)
TOILETING: 2-->ASSISTIVE PERSON
EATING: 2 - ASSISTIVE PERSON
GROOMING: HANDWASHING;PROMPTS;WITH ASSISTANCE
BATHING: 2 - ASSISTIVE PERSON
SWALLOWING: 0-->SWALLOWS FOODS/LIQUIDS WITHOUT DIFFICULTY
AMBULATION: 0 - INDEPENDENT
ORAL_HYGIENE: WITH ASSISTANCE
BATHING: 2-->ASSISTIVE PERSON
COGNITION: 2 - DIFFICULTY WITH ORGANIZING THOUGHTS
DRESS: SCRUBS (BEHAVIORAL HEALTH);WITH ASSISTANCE
TRANSFERRING: 2-->ASSISTIVE PERSON
RETIRED_EATING: 2-->ASSISTIVE PERSON
DRESS: SCRUBS (BEHAVIORAL HEALTH);WITH ASSISTANCE
AMBULATION: 2-->ASSISTIVE PERSON
TRANSFERRING: 0 - INDEPENDENT
GROOMING: HANDWASHING;PROMPTS;WITH ASSISTANCE
TOILETING: 2 - ASSISTIVE PERSON
COMMUNICATION: 3 - UNABLE TO UNDERSTAND OR SPEAK (NOT RELATED TO LANGUAGE BARRIER)
ORAL_HYGIENE: WITH ASSISTANCE
DRESS: 2-->ASSISTIVE PERSON
DRESS: 2 - ASSISTIVE PERSON
FALL_HISTORY_WITHIN_LAST_SIX_MONTHS: NO

## 2018-04-01 NOTE — ED NOTES
Pt has been staying in the room the whole shift, resting in bed, calm, cooperative, took scheduled meds without problems, ate 100% supper time. Pt does not speak, but nods/shakes head when asked questions. Pt toileted every 2-4 hrs. Pt wearing pull ups. SBA - ambulation

## 2018-04-01 NOTE — PROGRESS NOTES
Pt has been physically cued to toilet. Pt sat on the toilet. No micturition or defecation this time.

## 2018-04-01 NOTE — PROGRESS NOTES
Pt arrived on the unit via 3 security guards.  He tried to elope in the elevator.  He was agitated when he arrived on the unit.  He was escorted to his room immediately.   Admission needs to be completed.  Reported to day shift.

## 2018-04-01 NOTE — PLAN OF CARE
Problem: Patient Care Overview  Goal: Plan of Care/Patient Progress Review  Outcome: Therapy, progress toward functional goals is gradual  Illness Management Recovery model:  Self-Reflection & Planning.    Assessed patient's progress completing forms related to Illness Management Recovery (including Personal Plan of Care, Adult Coping Plan, and My Support and Coping Plan) and assisted as needed.    Encouraged patient to continue to consider triggers, wellness strategies, early warning signs, feedback from others, actions to take to prevent relapse, and coping strategies as part of a plan to remain well after leaving the hospital.

## 2018-04-01 NOTE — PROGRESS NOTES
04/01/18 0925   Patient Belongings   Did you bring any home meds/supplements to the hospital?  No   Patient Belongings clothing;shoes   Disposition of Belongings Locker   Belongings Search Yes   Clothing Search Yes   Second Staff Alejandro Villarreal (night PA's)     LOCKER: jeans, collared shirt, shoes with laces, karina bear    A               Admission:  I am responsible for any personal items that are not sent to the safe or pharmacy.  [facility] is not responsible for loss, theft or damage of any property in my possession.    Signature:  _________________________________ Date: _______  Time: _____                                              Staff Signature:  ____________________________ Date: ________  Time: _____      2nd Staff person, if patient is unable/unwilling to sign:    Signature: ________________________________ Date: ________  Time: _____     Discharge:  [facility] has returned all of my personal belongings:    Signature: _________________________________ Date: ________  Time: _____                                          Staff Signature:  ____________________________ Date: ________  Time: _____
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
## 2018-04-01 NOTE — PROGRESS NOTES
Initial Psychosocial Assessment    I have reviewed the chart, met with the patient, and developed Care Plan.  Information for assessment was obtained from: chart review only. Patient is not able to communicate.      Presenting Problem: Patient is on a 72 hour hold  Patient is a 47 year old  male sent in by his nursing home for increasing agitation, aggression and insomnia. Patient has been discharged from his nursing home because they cannot manage him. He has been agitated during the day, difficult to redirect, yelling and singing at the top of his voice day and night, and disrupting other residents.     History of Mental Health and Chemical Dependency:  Patient has a history of unspecified psychosis and intellectual disability. This is is first mental health hospitalization. No CD history.    Family Description (Constellation, Family Psychiatric History):  Patient is single and has never been . Patient lived with his mother up until 12/2016 when he got sick and moved into the nursing home. His nursing home reports that family rarely visit him and they take up collections for incidental expenses. Per chart, patient's mother recently went to a TCU.    Significant Life Events (Illness, Abuse, Trauma, Death):  Unable to assess    Living Situation:  Patient has been living at [facility] but was discharged due to aggression and unmanageable behavior. His niece reports that he may be able to live with his sisters if another facility will not take him.    Educational Background:  Unable to assess    Occupational History:  Patient has never worked due to his developmental disabiity    Financial Status:  Unable to assess    Legal Issues:  Patient is his own legal guardian. His niece reports that his mother and sister make decisions for him.    Ethnic/Cultural Issues:  Single  male with developmental disability.    Spiritual Orientation:  Unable to  assess     Service History:  None    Social Functioning (organization, interests):  Unable to assess    Current Treatment Providers are:  Medication Management:  Selena Marquez MS, ACP, [address], [address], [place]  [address], [phone], Fax:  [phone]  Therapy: Jessica Ribeiro, PhD  Family contacts:  Niece Brittany Dalal, [phone] and sister Candice Rivera, [phone]  Nursing Home staff:  [phone]    Social Service Assessment/Plan:  Patient would benefit from a county . Living arrangements will need to be explored as well as guardianship. Coordinate care with family.

## 2018-04-01 NOTE — ED PROVIDER NOTES
Patient was signed out to me at shift change.  Please see the notes from the initial provider for initial ED presentation and course.  Plan at this time is for the patient to be admitted to mental health bed because of agitation and aggression at his group home.  He is here from a group home and has a developmental delay.  He is on a hold.  Unfortunately, due to staffing needs, a bed is not available for him at this time.  Patient has remained calm and cooperative throughout the shift this evening.  He did not require any as needed medications for agitation.     Coreen Echevarria MD  03/31/18 3464

## 2018-04-01 NOTE — PROGRESS NOTES
At the start of this shift ,0700, Pt was being directed by to staff to BR. Pt needed physical cues to use the toilet. Pt voided continently. This staff received report. Pt from [facility] who have discharged him,don't want him back. Pt has been aggressive to staff and other Pts. Pt had just come on to the unit with 3 security officers and staff. Pt was agitated. Upon meeting Pt after report, Pt set up at breakfast and was eating well. Pt needed food opened to eat it otherwise would not eat it. Pt non verbal. Pt offered Oral meds (in priority). Pt took oral Zyprexa, then refused all other meds. Offered a pill in pudding,but Pt picked it out. Pt has been ambulating about the unit. Pt seems steady on his feet.Pt is on 1:1 status. Pt just now dozing in his chair.Pt vital signs OK. Spoke with [facility], I had questions about Pt meds, They had discharged Pt a day ago and had no info for me. Pusued our Pharmacy staff who rec onciled Pt meds in ER and this was helpful. Will cont to monitor.

## 2018-04-01 NOTE — PROGRESS NOTES
Dr Boyle has seen Pt.Aware of Pt potential for Physical aggression. Attempted to perform Admit with Pt. Pt unable to answer questions. Pt awake ,sitting in lounge. Pt cont on 1:1 status.

## 2018-04-02 LAB
DEPRECATED CALCIDIOL+CALCIFEROL SERPL-MC: 14 UG/L (ref 20–75)
FOLATE SERPL-MCNC: 11.4 NG/ML
VIT B12 SERPL-MCNC: 435 PG/ML (ref 193–986)

## 2018-04-02 PROCEDURE — 82607 VITAMIN B-12: CPT | Performed by: PSYCHIATRY & NEUROLOGY

## 2018-04-02 PROCEDURE — 82746 ASSAY OF FOLIC ACID SERUM: CPT | Performed by: PSYCHIATRY & NEUROLOGY

## 2018-04-02 PROCEDURE — 99232 SBSQ HOSP IP/OBS MODERATE 35: CPT | Performed by: PSYCHIATRY & NEUROLOGY

## 2018-04-02 PROCEDURE — 25000132 ZZH RX MED GY IP 250 OP 250 PS 637: Performed by: PSYCHIATRY & NEUROLOGY

## 2018-04-02 PROCEDURE — 12400005 ZZH R&B MH CRITICAL SENIOR/ADOLESCENT

## 2018-04-02 PROCEDURE — 25000132 ZZH RX MED GY IP 250 OP 250 PS 637: Performed by: INTERNAL MEDICINE

## 2018-04-02 PROCEDURE — 82306 VITAMIN D 25 HYDROXY: CPT | Performed by: PSYCHIATRY & NEUROLOGY

## 2018-04-02 ASSESSMENT — ACTIVITIES OF DAILY LIVING (ADL)
GROOMING: PROMPTS
ORAL_HYGIENE: PROMPTS
ORAL_HYGIENE: PROMPTS
DRESS: SCRUBS (BEHAVIORAL HEALTH)
DRESS: SCRUBS (BEHAVIORAL HEALTH)
GROOMING: PROMPTS

## 2018-04-02 NOTE — PLAN OF CARE
Problem: Patient Care Overview  Goal: Team Discussion  Team Plan:   BEHAVIORAL TEAM DISCUSSION    Participants: Nabor Abraham RN, CLARA Bassett  Progress: New admit  Continued Stay Criteria/Rationale: Aggressive behavior  Medical/Physical: See medical notes  Precautions:   Behavioral Orders   Procedures     Code 1 - Restrict to Unit     Elopement precautions     Routine Programming     As clinically indicated     Status 15     Every 15 minutes.     Status Individual Observation     Order Specific Question:   CONTINUOUS 24 hours / day     Answer:   Distance and Exceptions     Order Specific Question:   Distance     Answer:   5 feet     Order Specific Question:   Exceptions     Answer:   none     Suicide precautions     Patients on Suicide Precautions should have a Combination Diet ordered that includes a Diet selection(s) AND a Behavioral Tray selection for Safe Tray - with utensils, or Safe Tray - NO utensils       Plan:The plan is to assess the patient for mental health and medication needs.  The patient will be prescribed   medications to treat the identified symptoms.  Upon discharge the patient will be referred to services as   appropriate based on the assessment.   Rationale for change in precautions or plan: N/A      Problem: General Plan of Care (Inpatient Behavioral)  Goal: Team Discussion  Team Plan:   BEHAVIORAL TEAM DISCUSSION    Participants: Nabor Abraham RN, Melida Morales, [facility]  Progress: New admit  Continued Stay Criteria/Rationale: Aggressive behavior  Medical/Physical: See medical notes  Precautions:   Behavioral Orders   Procedures     Code 1 - Restrict to Unit     Elopement precautions     Routine Programming     As clinically indicated     Status 15     Every 15 minutes.     Status Individual Observation     Order Specific Question:   CONTINUOUS 24 hours / day     Answer:   Distance and Exceptions     Order Specific Question:   Distance     Answer:   5 feet      Order Specific Question:   Exceptions     Answer:   none     Suicide precautions     Patients on Suicide Precautions should have a Combination Diet ordered that includes a Diet selection(s) AND a Behavioral Tray selection for Safe Tray - with utensils, or Safe Tray - NO utensils       Plan:The plan is to assess the patient for mental health and medication needs.  The patient will be prescribed   medications to treat the identified symptoms.  Upon discharge the patient will be referred to services as   appropriate based on the assessment.   Rationale for change in precautions or plan: N/A

## 2018-04-02 NOTE — PLAN OF CARE
Problem: Behavioral Disturbance  Goal: Behavioral Disturbance  Pt able to maintain a reality oriented conversation.    Pt responds appropriately to redirection for aggressive, intrusive behavior.    Pt attends groups without difficulty, responding appropriately to the activities of the group.    Pt completes ADL's each shift.    Pt's intake of fluids and nutrition is adequate.  Signs and symptoms of listed problems will be absent or manageable by discharge or transition of care.  Outcome: No Change    Pt presents confused and distractible thinking. Poor concentration exhibited. Pt oriented to self and able to nod when asked  are you Ronald Dalal.  Pt disoriented to place, time, date and situation. Affect blunted and tense. Mood is calm but Pt gets irritable at times of giving personal care. Appearance disheveled and untidy. Pt s speech muted for majority of shift. Only verbalized speech was one-word answers or short statements. One word or short statements would exhibited pressured speech. Statements/words included  No! ,  I don t want to do that! , and  I won t take that!  Pt seen to communicate through head nodding.   Pt seen to eat 100% of dinner. Pt also seen to eat multiple snacks and consume multiple small cups of fluids. Pt seen to go to sleep at approximately 2200. Pt took medications that are solution or ODT. Pt refused oral tabs.   Pt showed no signs of acute physical injury. Pt unable to rate anxiety or depression. Writer unable to assess Pt s SI/SIB/HI due to muted speech; however, Pt showed no signs of SI/SIB/HI.   Pt showed no signs of elopement during this shift.   Pt seen visible in the milieu for majority of shift. Pt seen to walk/wonder in the halls sporadically.   Pt had small BM X1. Pt was incontinent X2. Pt used toilet for voiding X1. Pt offered toileting multiple times while in room.   Pt seen to become irritable, tense and angry upon command for cares to Pt. Pt responded well to staff giving  him space, asking him to follow commands in a gentle and respectful manner, and re-approaching.

## 2018-04-02 NOTE — PROGRESS NOTES
[facility], [place]   Psychiatric Progress Note        Interim History:   The patient's care was discussed with the treatment team during the daily team meeting and/or staff's chart notes were reviewed.  Staff report patient didn't take his morning medications. Is not oriented.     The patient answers questions incorrectly (according to staff who witnessed him eating breakfast, etc.) Does not answer questions regarding mood, suicidality, or psychosis. Poor eye contact.          Medications:       gabapentin  200 mg Oral TID     valproic acid  500 mg Oral At Bedtime     OLANZapine zydis  20 mg Oral At Bedtime     sulfaSALAzine  1,000 mg Oral BID     furosemide  10 mg Oral Q Mon Wed Fri AM     sennosides  2 tablet Oral BID     multivitamin, therapeutic with minerals  1 tablet Oral Once     tamsulosin  0.4 mg Oral Daily          Allergies:   No Known Allergies       Labs:     Recent Results (from the past 24 hour(s))   Drug abuse screen 6 urine (tox)    Collection Time: 04/01/18  5:00 PM   Result Value Ref Range    Amphetamine Qual Urine Negative NEG^Negative    Barbiturates Qual Urine Negative NEG^Negative    Benzodiazepine Qual Urine Negative NEG^Negative    Cannabinoids Qual Urine Negative NEG^Negative    Cocaine Qual Urine Negative NEG^Negative    Ethanol Qual Urine Negative NEG^Negative    Opiates Qualitative Urine Negative NEG^Negative          Psychiatric Examination:     /68  Pulse 81  Temp 97.6  F (36.4  C) (Tympanic)  Resp 16  Wt 62.1 kg (136 lb 14.4 oz)  SpO2 99%  Weight is 136 lbs 14.4 oz  There is no height or weight on file to calculate BMI.  Orthostatic Vitals       Most Recent      Sitting Orthostatic /65 04/02 0807    Sitting Orthostatic Pulse (bpm) 82 04/02 0807    Standing Orthostatic /72 04/02 0807    Standing Orthostatic Pulse (bpm) 86 04/02 0807            Appearance: awake  Attitude:  uncooperative  Eye Contact:  poor   Mood:  unable to  assess  Affect:  intensity is flat  Speech:  paucity of speech  Psychomotor Behavior:  physical retardation  Thought Process:  evidence of thought blocking present  Associations:  no loose associations  Thought Content:  no evidence of suicidal ideation or homicidal ideation and patient appears to be responding to internal stimuli  Insight:  limited  Judgement:  limited  Oriented to:  person  Attention Span and Concentration:  poor  Recent and Remote Memory:  limited    Clinical Global Impressions  First:  Considering your total clinical experience with this particular patient population, how severe are the patient's symptoms at this time?: 7 (04/01/18 1140)  Compared to the patient's condition at the START of treatment, this patient's condition is:: 4 (04/01/18 1140)  Most recent:  Considering your total clinical experience with this particular patient population, how severe are the patient's symptoms at this time?: 7 (04/01/18 1140)  Compared to the patient's condition at the START of treatment, this patient's condition is:: 4 (04/01/18 1140)    # Pain Assessment:  Current Pain Score 3/31/2018   Patient currently in pain? COREY   Pain score (0-10) -   - Ronald is unable to participate in a collaborative plan for pain management due to current level of psychosis.   - Please see the plan for pain management as documented above           Precautions:     Behavioral Orders   Procedures     Code 1 - Restrict to Unit     Elopement precautions     Routine Programming     As clinically indicated     Status 15     Every 15 minutes.     Status Individual Observation     Order Specific Question:   CONTINUOUS 24 hours / day     Answer:   Distance and Exceptions     Order Specific Question:   Distance     Answer:   5 feet     Order Specific Question:   Exceptions     Answer:   none     Suicide precautions     Patients on Suicide Precautions should have a Combination Diet ordered that includes a Diet selection(s) AND a Behavioral  Tray selection for Safe Tray - with utensils, or Safe Tray - NO utensils            DIagnoses:   Bipolar disorder Type 1 versus 2         Plan:     1) Continue Zyprexa.   2) VPA 500mg Qday started yesterday.   3) Gabapentin 200mg TID started yesterday.   4) Patient admitted on a 72-hour hold. Will file petition due to current lack of capacity and guardian.   5) Continue SIO precautions.   6) Disposition to be determined. Patient to show improvement in terms of psychosis and aggression prior to discharge.

## 2018-04-02 NOTE — PROGRESS NOTES
Faxed petition for commitment to [facility] at [phone].    Received call from Dr. Gallardo at [facility] pre-petition screening who will be out tomorrow morning to meet with pt.

## 2018-04-02 NOTE — PROGRESS NOTES
"   04/02/18 1441   Behavioral Health   Hallucinations denies / not responding to hallucinations   Thinking other (see comment)  (COREY)   Orientation other (see comment)  (COREY)   Memory other (see comment)  (COREY)   Insight other (see comment)  (COREY)   Judgement impaired   Eye Contact rolling   Affect blunted, flat   Mood anxious;irritable   Physical Appearance/Attire untidy   Hygiene neglected grooming - unclean body, hair, teeth   Suicidality other (see comments)  (Denied)   1. Wish to be Dead No   2. Non-Specific Active Suicidal Thoughts  No   Change in Protective Factors? Yes (see comments)  (Removal from group home)   Enviromental Risk Factors None   Self Injury other (see comment)  (Denied)   Elopement (None observed)   Activity withdrawn;restless   Speech other (see comments)  (Simple sentences)   Medication Sensitivity no stated side effects;no observed side effects   Psychomotor / Gait balanced;steady   Activities of Daily Living   Hygiene/Grooming prompts   Oral Hygiene prompts   Dress scrubs (behavioral health)   Room Organization prompts     Pt was visible aimlessly pacing the community areas and sleeping in chairs for most of the shift. Pt did not engaged in any goal-directed behavior. Pt also did not engage anyone in a meaningful way. When asked direct questions, pt often averted his eyes and only shaked head \"no\" or \"yes.\" When pressed to complete a task, whether it was to take a rest from pacing or stand up to take an orthostatic blood pressure, pt was observed to shout comments such as, \"I don't want to sit!\" or \"No, I want to stay right here!\" Pt fell asleep multiple times in community areas and adamantly refused to retire to his room despite multiple verbal prompts. Pt was able to feed self and breakfast and dinner and seemed to have an adequate appetite. At one point, pt became incontinent of bowel and bladder while ambulating in the hallway. Pt went to his room and cleaned and changed himself with " continuous verbal prompting. Taye EID 4/2/18

## 2018-04-03 PROCEDURE — 25000132 ZZH RX MED GY IP 250 OP 250 PS 637: Performed by: PSYCHIATRY & NEUROLOGY

## 2018-04-03 PROCEDURE — 25000132 ZZH RX MED GY IP 250 OP 250 PS 637: Performed by: INTERNAL MEDICINE

## 2018-04-03 PROCEDURE — 12400005 ZZH R&B MH CRITICAL SENIOR/ADOLESCENT

## 2018-04-03 PROCEDURE — 99233 SBSQ HOSP IP/OBS HIGH 50: CPT | Performed by: PSYCHIATRY & NEUROLOGY

## 2018-04-03 RX ADMIN — VALPROIC ACID 500 MG: 250 SOLUTION ORAL at 00:00

## 2018-04-03 RX ADMIN — GABAPENTIN 200 MG: 250 SUSPENSION ORAL at 17:58

## 2018-04-03 RX ADMIN — GABAPENTIN 200 MG: 250 SUSPENSION ORAL at 08:39

## 2018-04-03 RX ADMIN — GABAPENTIN 200 MG: 250 SUSPENSION ORAL at 13:50

## 2018-04-03 RX ADMIN — VALPROIC ACID 500 MG: 250 SOLUTION ORAL at 19:14

## 2018-04-03 RX ADMIN — OLANZAPINE 20 MG: 20 TABLET, ORALLY DISINTEGRATING ORAL at 19:08

## 2018-04-03 ASSESSMENT — ACTIVITIES OF DAILY LIVING (ADL)
ORAL_HYGIENE: PROMPTS
GROOMING: PROMPTS
LAUNDRY: UNABLE TO COMPLETE
ORAL_HYGIENE: PROMPTS
HYGIENE/GROOMING: PROMPTS
DRESS: SCRUBS (BEHAVIORAL HEALTH)
DRESS: SCRUBS (BEHAVIORAL HEALTH)

## 2018-04-03 NOTE — PROGRESS NOTES
Refused all meds. Slept in chair,  did make a few statements but otherwise dozed in chair. Appetite good for dinner. No aggressive behavior.

## 2018-04-03 NOTE — PROGRESS NOTES
"[facility], [place]   Psychiatric Progress Note        Interim History:   The patient's care was discussed with the treatment team during the daily team meeting and/or staff's chart notes were reviewed.  Staff report patient has been taking liquid formulations of medications.     The patient says \"I don't want no more\" about pill forms of medications. Shakes head when asked about problems with mood. Denies problems with sleep or appetite. Denies SI? Doesn't respond to questions about pain.     Did speak to patient's niece Brittany (patient nodded permission). Says that the patient usually doesn't talk much. His mother was taking care of him prior to entering rehab facility. Was supposed to transition to a group home but this was put off. Says that he will get angry but doesn't harm himself or others as far as she is aware.          Medications:       gabapentin  200 mg Oral TID     valproic acid  500 mg Oral At Bedtime     OLANZapine zydis  20 mg Oral At Bedtime     sulfaSALAzine  1,000 mg Oral BID     furosemide  10 mg Oral Q Mon Wed Fri AM     sennosides  2 tablet Oral BID     multivitamin, therapeutic with minerals  1 tablet Oral Once     tamsulosin  0.4 mg Oral Daily          Allergies:   No Known Allergies       Labs:     No results found for this or any previous visit (from the past 24 hour(s)).       Psychiatric Examination:     BP 96/70  Pulse 78  Temp 98.5  F (36.9  C) (Oral)  Resp 16  Wt 62.1 kg (136 lb 14.4 oz)  SpO2 99%  Weight is 136 lbs 14.4 oz  There is no height or weight on file to calculate BMI.  Orthostatic Vitals       Most Recent      Sitting Orthostatic /65 04/02 0807    Sitting Orthostatic Pulse (bpm) 82 04/02 0807    Standing Orthostatic /72 04/02 0807    Standing Orthostatic Pulse (bpm) 86 04/02 0807            Appearance: awake  Attitude:  slightly uncooperative  Eye Contact:  poor   Mood:  shakes head  Affect:  intensity is flat  Speech:  paucity " of speech  Psychomotor Behavior:  physical retardation  Thought Process:  evidence of thought blocking present  Associations:  no loose associations  Thought Content:  no evidence of suicidal ideation or homicidal ideation and patient appears to be responding to internal stimuli  Insight:  limited  Judgement:  limited  Oriented to:  person  Attention Span and Concentration:  poor  Recent and Remote Memory:  limited    Clinical Global Impressions  First:  Considering your total clinical experience with this particular patient population, how severe are the patient's symptoms at this time?: 7 (04/01/18 1140)  Compared to the patient's condition at the START of treatment, this patient's condition is:: 4 (04/01/18 1140)  Most recent:  Considering your total clinical experience with this particular patient population, how severe are the patient's symptoms at this time?: 7 (04/01/18 1140)  Compared to the patient's condition at the START of treatment, this patient's condition is:: 4 (04/01/18 1140)    # Pain Assessment:  Current Pain Score 3/31/2018   Patient currently in pain? COREY   Pain score (0-10) -   - Ronald is unable to participate in a collaborative plan for pain management due to current level of psychosis.   - Please see the plan for pain management as documented above           Precautions:     Behavioral Orders   Procedures     Code 1 - Restrict to Unit     Elopement precautions     Routine Programming     As clinically indicated     Status 15     Every 15 minutes.     Status Individual Observation     Order Specific Question:   CONTINUOUS 24 hours / day     Answer:   Distance and Exceptions     Order Specific Question:   Distance     Answer:   5 feet     Order Specific Question:   Exceptions     Answer:   none     Suicide precautions     Patients on Suicide Precautions should have a Combination Diet ordered that includes a Diet selection(s) AND a Behavioral Tray selection for Safe Tray - with utensils, or  Safe Tray - NO utensils            DIagnoses:   Bipolar disorder Type 1 versus 2         Plan:     1) Continue Zyprexa. Change to liquid formulation.   2) VPA 500mg liquid Qday started on admission.   3) Gabapentin 200mg liquid TID started on admission.   4) Patient admitted on a 72-hour hold. Filed petition due to current lack of capacity and guardian.   5) Continue SIO precautions.   6) Disposition to be determined. May look at transferring to general adult unit. Patient to show improvement in terms of psychosis and aggression prior to discharge.

## 2018-04-03 NOTE — PROGRESS NOTES
Pt has not attended OT groups yet. They will be encouraged to attend groups when able and be encouraged to participate in highly structured and success oriented activities.  OT staff set goals.

## 2018-04-03 NOTE — PROGRESS NOTES
Pt spent most of the shift in the lounge keeping to himself. Pt played with puzzle pieces for a little while. Pt ate breakfast and lunch. Pt allowed staff to take his vitals and took his medication. Pt showed no signs of aggression. Pt communicates mostly by shaking his head up/down for yes and sideways for no. Pt did not wish to speak to me so I was unable to complete the [place] assessment.        04/03/18 1400   Behavioral Health   Hallucinations other (see comment)  (maggie)   Thinking other (see comment)  (maggie)   Orientation other (see comment)  (maggie)   Memory other (see comment)  (maggie)   Insight other (see comment)  (maggie)   Judgement impaired   Eye Contact into space   Affect tense   Mood mood is calm   Physical Appearance/Attire untidy   Hygiene neglected grooming - unclean body, hair, teeth   Suicidality other (see comments)  (maggie)   1. Wish to be Dead (maggie)   2. Non-Specific Active Suicidal Thoughts  (maggie)   Self Injury other (see comment)  (maggie)   Elopement (none observed )   Activity withdrawn   Speech other (see comments)  (mostly nonverbal)   Medication Sensitivity other (see comment)  (maggie)   Psychomotor / Gait slow;balanced   Activities of Daily Living   Hygiene/Grooming prompts   Oral Hygiene prompts   Dress scrubs (behavioral health)   Laundry unable to complete   Room Organization prompts

## 2018-04-03 NOTE — PROGRESS NOTES
"Pt was approached with AM medications. Tablets and capsule were placed in applesauce. \"I don't want anymore\" Pt took the applesauce and ate the whole container leaving the pills in the bottom. Gabapentin was placed in cranberry juice. Pt did drink all of this. Senna was held due to loose stools.   "

## 2018-04-04 PROCEDURE — 25000132 ZZH RX MED GY IP 250 OP 250 PS 637: Performed by: PSYCHIATRY & NEUROLOGY

## 2018-04-04 PROCEDURE — 12400005 ZZH R&B MH CRITICAL SENIOR/ADOLESCENT

## 2018-04-04 PROCEDURE — 99232 SBSQ HOSP IP/OBS MODERATE 35: CPT | Performed by: PSYCHIATRY & NEUROLOGY

## 2018-04-04 RX ADMIN — GABAPENTIN 200 MG: 250 SUSPENSION ORAL at 21:07

## 2018-04-04 RX ADMIN — GABAPENTIN 200 MG: 250 SUSPENSION ORAL at 08:52

## 2018-04-04 RX ADMIN — SENNOSIDES A AND B 10 ML: 415.36 LIQUID ORAL at 22:10

## 2018-04-04 RX ADMIN — VALPROIC ACID 500 MG: 250 SOLUTION ORAL at 21:07

## 2018-04-04 RX ADMIN — GABAPENTIN 200 MG: 250 SUSPENSION ORAL at 13:48

## 2018-04-04 RX ADMIN — OLANZAPINE 20 MG: 5 TABLET, ORALLY DISINTEGRATING ORAL at 21:08

## 2018-04-04 ASSESSMENT — ACTIVITIES OF DAILY LIVING (ADL)
DRESS: SCRUBS (BEHAVIORAL HEALTH)
ORAL_HYGIENE: PROMPTS
GROOMING: PROMPTS
LAUNDRY: UNABLE TO COMPLETE
DRESS: SCRUBS (BEHAVIORAL HEALTH)
GROOMING: PROMPTS
ORAL_HYGIENE: PROMPTS

## 2018-04-04 NOTE — PLAN OF CARE
Problem: Behavioral Disturbance  Goal: Behavioral Disturbance  Pt able to maintain a reality oriented conversation.    Pt responds appropriately to redirection for aggressive, intrusive behavior.    Pt attends groups without difficulty, responding appropriately to the activities of the group.    Pt completes ADL's each shift.    Pt's intake of fluids and nutrition is adequate.  Signs and symptoms of listed problems will be absent or manageable by discharge or transition of care.   Outcome: Improving  No aggressive behavior. Today is more alert and smiling . Answers questions at times, gives one or 2 word answers. Took most of his meds today. He spent much of the evening, working on a puzzle and even though the pieces did not fit correctly, he would gently pound on the pieces to make them fit, he stayed focused on this activity for quite some time. Appetite fair.

## 2018-04-04 NOTE — PROGRESS NOTES
"[facility], [place]   Psychiatric Progress Note        Interim History:   The patient's care was discussed with the treatment team during the daily team meeting and/or staff's chart notes were reviewed.  Staff report patient has been taking liquid formulations of medications. Was restless overnight.     The patient says that he is \"okay.\" Shakes head to deny problems with mood, sleep or appetite. Denies SI or AH. Denies pain. Does echo \"thank you\" at the end of the interview and makes eye contact briefly.          Medications:       OLANZapine  20 mg Oral At Bedtime     gabapentin  200 mg Oral TID     valproic acid  500 mg Oral At Bedtime     sulfaSALAzine  1,000 mg Oral BID     furosemide  10 mg Oral Q Mon Wed Fri AM     sennosides  2 tablet Oral BID     multivitamin, therapeutic with minerals  1 tablet Oral Once     tamsulosin  0.4 mg Oral Daily          Allergies:   No Known Allergies       Labs:     No results found for this or any previous visit (from the past 24 hour(s)).       Psychiatric Examination:     /83  Pulse 72  Temp 97.6  F (36.4  C) (Tympanic)  Resp 16  Wt 62.1 kg (136 lb 14.4 oz)  SpO2 99%  Weight is 136 lbs 14.4 oz  There is no height or weight on file to calculate BMI.  Orthostatic Vitals       Most Recent      Sitting Orthostatic /65 04/02 0807    Sitting Orthostatic Pulse (bpm) 82 04/02 0807    Standing Orthostatic /72 04/02 0807    Standing Orthostatic Pulse (bpm) 86 04/02 0807            Appearance: awake  Attitude:  more cooperative  Eye Contact:  poor   Mood:  denies depression  Affect:  intensity is flat  Speech:  paucity of speech, does utter some phrases  Psychomotor Behavior:  physical retardation  Thought Process:  evidence of thought blocking present  Associations:  no loose associations  Thought Content:  no evidence of suicidal ideation or homicidal ideation and patient appears to be responding to internal stimuli  Insight:  " partial  Judgement:  limited  Oriented to:  person  Attention Span and Concentration:  poor  Recent and Remote Memory:  limited    Clinical Global Impressions  First:  Considering your total clinical experience with this particular patient population, how severe are the patient's symptoms at this time?: 7 (04/01/18 1140)  Compared to the patient's condition at the START of treatment, this patient's condition is:: 4 (04/01/18 1140)  Most recent:  Considering your total clinical experience with this particular patient population, how severe are the patient's symptoms at this time?: 7 (04/01/18 1140)  Compared to the patient's condition at the START of treatment, this patient's condition is:: 4 (04/01/18 1140)    # Pain Assessment:  Current Pain Score 3/31/2018   Patient currently in pain? COREY   Pain score (0-10) -   - Ronald currently denies any pain.   - Please see the plan for pain management as documented above           Precautions:     Behavioral Orders   Procedures     Code 1 - Restrict to Unit     Elopement precautions     Routine Programming     As clinically indicated     Status 15     Every 15 minutes.     Status Individual Observation     Order Specific Question:   CONTINUOUS 24 hours / day     Answer:   Distance and Exceptions     Order Specific Question:   Distance     Answer:   5 feet     Order Specific Question:   Exceptions     Answer:   none     Suicide precautions     Patients on Suicide Precautions should have a Combination Diet ordered that includes a Diet selection(s) AND a Behavioral Tray selection for Safe Tray - with utensils, or Safe Tray - NO utensils            DIagnoses:   Bipolar disorder Type 1 versus 2         Plan:     1) Continue Zyprexa. Changed to liquid formulation.   2) VPA 500mg liquid Qday started on admission.   3) Gabapentin 200mg liquid TID started on admission.   4) Patient admitted on a 72-hour hold. Filed petition due to current lack of capacity and guardian. Supported by  [facility].   5) Continue SIO precautions.   6) Disposition to be determined. May look at transferring to general adult unit or station 12 if warranted. Patient to show improvement in terms of psychosis and aggression prior to discharge.

## 2018-04-04 NOTE — PROGRESS NOTES
"Woke up at 0240 and had snacks, unable to fall back to sleep so Hydroxyzine 25 mg offered.Pt drank the water but did not take the medication.Restless and difficulty to redirect, raised his voice when redirected by staff. Zyprexa offered but pt refused to take it, \" No!\".  Given a puzzle to work on,sat for a few minutes but started wandering again.Tried to go into other patient's room.Tucked in bed x 3 but got up after a few minutes, appeared tired and yawning but will not stay in bed.Tried to have him sit on the gerichair in the lounge with the CARE channel on but not helpful/effective.  Fell back to sleep at 0450.  "

## 2018-04-04 NOTE — PROGRESS NOTES
Naomy from [facility] court called today issuing a court hold starting at 3:56 pm today with court hearing scheduled for Monday.  will come tomorrow to serve him. Left message for Melida SANZ.    Pt remains non-verbal with occasional outbursts. Pt seemed happy in the milieu doing puzzles, drawing, listening to relaxing sounds on the sound machine. Pt remained calm and relaxed through most of the shift. Pt ate his dinner as well.    Pt took all meds except for sulfasalazine which was tab form. Writer got senna tablets switched to liquid form, which the pt took. No other concerns at this time.

## 2018-04-04 NOTE — PROGRESS NOTES
Received call from Dr. Gallardo at [facility] ([phone]) who reports petition for commitment was supported.

## 2018-04-04 NOTE — PROGRESS NOTES
"Pt mostly non-verbal. Pt has had a few outbursts at staff and Pts such as.   \"Do you need to use the bathroom?\" - \"I already have!\"  \"Would you like to join us in group?\" - \"Get out of my face!\"  Pt's mood otherwise calm, tends to sit in lounge though does not socialize w/ staff or Pts.  Pt did not respond to check-in questions.     04/04/18 1153   Behavioral Health   Hallucinations other (see comment)  (COREY)   Thinking other (see comment)  (COREY)   Orientation person: oriented   Memory other (see comment)  (COREY)   Insight other (see comment)  (COREY)   Judgement impaired   Eye Contact into space;out of corner of eyes   Affect blunted, flat;angry   Mood irritable;mood is calm;labile   Physical Appearance/Attire untidy   Hygiene neglected grooming - unclean body, hair, teeth   Suicidality other (see comments)  (COREY)   1. Wish to be Dead (COREY)   2. Non-Specific Active Suicidal Thoughts  (COREY)   Self Injury other (see comment)  (COREY)   Elopement (None)   Activity withdrawn   Speech other (see comments)  (Minimal verbal responses, generally in outburst)   Medication Sensitivity no observed side effects;no stated side effects   Psychomotor / Gait steady;balanced;slow   Safety   Assault status continuous sight   Elopement status continuous sight   Psycho Education   Type of Intervention 1:1 intervention   Response unavailable   Hours 0.5   Treatment Detail Check-in   Activities of Daily Living   Hygiene/Grooming prompts   Oral Hygiene prompts   Dress scrubs (behavioral health)   Room Organization unable   Activity   Activity Assistance Provided independent;other (see comments)  (Prompts)   Behavioral Health Interventions   Behavioral Disturbance maintain safety precautions;maintain safe secure environment;simple, clear language;assist in development of alternative methods of expressive communication;encourage clear communication of needs;redirection of aggressive behaviors;assist patient in developing safety plan;provide " emotional support;encourage participation / independence with adls;establish therapeutic relationship;assist with developing & utilizing healthy coping strategies;provide positive feedback for use of effective coping skills;build upon strengths;assess patient response to medication;monitor need for prn medication;monitor confusion, memory loss, decision making ability and reorient / intervent as needed   Social and Therapeutic Interventions (Behavioral Disturbance) encourage participation in therapeutic groups and milieu activities;encourage effective boundaries with peers

## 2018-04-04 NOTE — PROGRESS NOTES
Left voice message for pt's niece, Ara at [phone], to gain collateral information.  Pt nodded when writer asked for permission to talk to her. Could not sign an PER.    Called [facility] in an attempt to make a referral for a DD waiver.  Was informed that they are not able to accept referral unless pt consents to it.  Since pt has been deemed to lack capacity, he is not able to consent to a referral at this time.

## 2018-04-05 PROCEDURE — 25000132 ZZH RX MED GY IP 250 OP 250 PS 637: Performed by: PSYCHIATRY & NEUROLOGY

## 2018-04-05 PROCEDURE — 25000132 ZZH RX MED GY IP 250 OP 250 PS 637: Performed by: INTERNAL MEDICINE

## 2018-04-05 PROCEDURE — 97150 GROUP THERAPEUTIC PROCEDURES: CPT | Mod: GO

## 2018-04-05 PROCEDURE — 12400005 ZZH R&B MH CRITICAL SENIOR/ADOLESCENT

## 2018-04-05 RX ADMIN — VALPROIC ACID 500 MG: 250 SOLUTION ORAL at 19:28

## 2018-04-05 RX ADMIN — SENNOSIDES A AND B 10 ML: 415.36 LIQUID ORAL at 19:28

## 2018-04-05 RX ADMIN — OLANZAPINE 20 MG: 5 TABLET, ORALLY DISINTEGRATING ORAL at 19:28

## 2018-04-05 RX ADMIN — SENNOSIDES A AND B 10 ML: 415.36 LIQUID ORAL at 07:46

## 2018-04-05 RX ADMIN — GABAPENTIN 200 MG: 250 SUSPENSION ORAL at 14:15

## 2018-04-05 RX ADMIN — GABAPENTIN 200 MG: 250 SUSPENSION ORAL at 07:45

## 2018-04-05 RX ADMIN — GABAPENTIN 200 MG: 250 SUSPENSION ORAL at 19:28

## 2018-04-05 ASSESSMENT — ACTIVITIES OF DAILY LIVING (ADL)
DRESS: WITH ASSISTANCE;PROMPTS
ORAL_HYGIENE: PROMPTS;WITH ASSISTANCE
LAUNDRY: UNABLE TO COMPLETE
ORAL_HYGIENE: PROMPTS;WITH ASSISTANCE
DRESS: WITH ASSISTANCE
LAUNDRY: UNABLE TO COMPLETE
GROOMING: PROMPTS;WITH ASSISTANCE
GROOMING: PROMPTS;WITH ASSISTANCE

## 2018-04-05 NOTE — PROGRESS NOTES
"Pt sits in lounge. Quiet. Pt takes oral meds that are in solution. Refuses tablets. Pt eats well, feeds self if food is opened. Pt ambulates some, wanders. Pt. is directable to toilet etc. Pt loud verbally if he declines something like pills, \"No!\" Pt will go to group with direction,but leaves. Pt will play at table with puzzle and doses off at times. Pt cont on 1:1 status.  "

## 2018-04-05 NOTE — PLAN OF CARE
Problem: General Plan of Care (Inpatient Behavioral)  Goal: Individualization/Patient Specific Goal (IP Behavioral)  The patient and/or their representative will achieve their patient-specific goals related to the plan of care.    The patient-specific goals include:   Outcome: Therapy, progress toward functional goals is gradual  Illness Management Recovery model:  Feedback.    Patient identified the following people they would like to receive feedback from if/when they see early warning signs:                      Support Group Pt unable to focus on groups.

## 2018-04-05 NOTE — PROGRESS NOTES
Pt continent of bladder when directed to B.R. Pt with some residual stool in pull ups, Pt allowed change and clean up.

## 2018-04-06 PROCEDURE — 25000132 ZZH RX MED GY IP 250 OP 250 PS 637: Performed by: PSYCHIATRY & NEUROLOGY

## 2018-04-06 PROCEDURE — 99232 SBSQ HOSP IP/OBS MODERATE 35: CPT | Performed by: PSYCHIATRY & NEUROLOGY

## 2018-04-06 PROCEDURE — 90853 GROUP PSYCHOTHERAPY: CPT

## 2018-04-06 PROCEDURE — 97150 GROUP THERAPEUTIC PROCEDURES: CPT | Mod: GO

## 2018-04-06 PROCEDURE — 25000132 ZZH RX MED GY IP 250 OP 250 PS 637: Performed by: INTERNAL MEDICINE

## 2018-04-06 PROCEDURE — 12400005 ZZH R&B MH CRITICAL SENIOR/ADOLESCENT

## 2018-04-06 RX ADMIN — GABAPENTIN 200 MG: 250 SUSPENSION ORAL at 19:46

## 2018-04-06 RX ADMIN — OLANZAPINE 20 MG: 5 TABLET, ORALLY DISINTEGRATING ORAL at 19:45

## 2018-04-06 RX ADMIN — GABAPENTIN 200 MG: 250 SUSPENSION ORAL at 08:41

## 2018-04-06 RX ADMIN — VALPROIC ACID 500 MG: 250 SOLUTION ORAL at 19:45

## 2018-04-06 RX ADMIN — Medication 10 MG: at 08:41

## 2018-04-06 RX ADMIN — GABAPENTIN 200 MG: 250 SUSPENSION ORAL at 13:41

## 2018-04-06 ASSESSMENT — ACTIVITIES OF DAILY LIVING (ADL)
GROOMING: PROMPTS
ORAL_HYGIENE: INDEPENDENT
ORAL_HYGIENE: PROMPTS
GROOMING: INDEPENDENT
DRESS: INDEPENDENT
DRESS: PROMPTS

## 2018-04-06 NOTE — PLAN OF CARE
"Problem: Behavioral Disturbance  Goal: Behavioral Disturbance  Pt able to maintain a reality oriented conversation.    Pt responds appropriately to redirection for aggressive, intrusive behavior.    Pt attends groups without difficulty, responding appropriately to the activities of the group.    Pt completes ADL's each shift.    Pt's intake of fluids and nutrition is adequate.  Signs and symptoms of listed problems will be absent or manageable by discharge or transition of care.   Outcome: No Change  Pt in milieu most of shift, withdrawn. Pt mostly nonverbal, pt stated \"I got it\" when putting a puzzle together. Took all schedulaed medication this shift except sulfaSALAzine. Pt incontinent of some BM, continent of large formed BM, staff assisted with incontinence care. Pt continoues on SIO. No aggressive behavior this shift.       "

## 2018-04-06 NOTE — PROGRESS NOTES
Pt mood has been calm. Pt primarily non-verbal, has attended groups though does not participate in related activities. Pt incontinent of urine. Pt did not participate in check-in. Writer unable to assess whether Pt is experiencing depression, anxiety, SI, or SIB. No observable response to hallucinations.       04/06/18 1000   Behavioral Health   Hallucinations other (see comment)  (COREY)   Thinking poor concentration;distractable   Orientation person: oriented   Memory baseline memory   Insight poor   Judgement impaired   Eye Contact at examiner   Affect blunted, flat;irritable   Mood mood is calm   Physical Appearance/Attire disheveled   Hygiene neglected grooming - unclean body, hair, teeth   Suicidality other (see comments)  (COREY)   1. Wish to be Dead (COREY)   2. Non-Specific Active Suicidal Thoughts  (COREY)   Self Injury other (see comment)  (COREY)   Elopement (None)   Activity withdrawn   Speech other (see comments)  (Non-verbal, minimal verbal response)   Medication Sensitivity no observed side effects;no stated side effects   Psychomotor / Gait balanced;steady;slow   Psycho Education   Type of Intervention 1:1 intervention   Response unavailable   Hours 0.5   Treatment Detail Check-in   Activities of Daily Living   Hygiene/Grooming independent   Oral Hygiene independent   Dress independent   Room Organization independent   Activity   Activity Assistance Provided independent   Behavioral Health Interventions   Behavioral Disturbance maintain safety precautions;maintain safe secure environment;assist in development of alternative methods of expressive communication;simple, clear language;encourage clear communication of needs;assist patient in developing safety plan;provide emotional support;establish therapeutic relationship;assist with developing & utilizing healthy coping strategies;provide positive feedback for use of effective coping skills;build upon strengths;assess patient response to medication;monitor  confusion, memory loss, decision making ability and reorient / intervent as needed;monitor need for prn medication   Social and Therapeutic Interventions (Behavioral Disturbance) encourage participation in therapeutic groups and milieu activities

## 2018-04-06 NOTE — PLAN OF CARE
Problem: Patient Care Overview  Goal: Team Discussion  Team Plan:    BEHAVIORAL TEAM DISCUSSION    Participants: Edwin Abraham, RN, CLARA Bassett, Elisabet Mathew OT  Progress: Some progress  Continued Stay Criteria/Rationale: Petition for commitment was initiated.  Pt's hearing is Monday, April 9th at 10:30 am  Medical/Physical:See medical notes  Precautions:   Behavioral Orders   Procedures     Code 1 - Restrict to Unit     Elopement precautions     Routine Programming     As clinically indicated     Status 15     Every 15 minutes.     Status Individual Observation     Order Specific Question:   CONTINUOUS 24 hours / day     Answer:   Distance and Exceptions     Order Specific Question:   Distance     Answer:   5 feet     Order Specific Question:   Exceptions     Answer:   none     Suicide precautions     Patients on Suicide Precautions should have a Combination Diet ordered that includes a Diet selection(s) AND a Behavioral Tray selection for Safe Tray - with utensils, or Safe Tray - NO utensils       Plan: Petition for commitment was filed with [facility]. Family would like for pt to discharge back to their care.  Rationale for change in precautions or plan: N/A      Problem: General Plan of Care (Inpatient Behavioral)  Goal: Team Discussion  Team Plan:    BEHAVIORAL TEAM DISCUSSION    Participants: Edwin Abraham RN, CLARA Bassett, Elisabet Mathew, OT  Progress: Some progress  Continued Stay Criteria/Rationale: Petition for commitment was initiated.  Pt's hearing is Monday, April 9th at 10:30 am  Medical/Physical:See medical notes  Precautions:   Behavioral Orders   Procedures     Code 1 - Restrict to Unit     Elopement precautions     Routine Programming     As clinically indicated     Status 15     Every 15 minutes.     Status Individual Observation     Order Specific Question:   CONTINUOUS 24 hours / day     Answer:   Distance and Exceptions     Order Specific Question:   Distance      Answer:   5 feet     Order Specific Question:   Exceptions     Answer:   none     Suicide precautions     Patients on Suicide Precautions should have a Combination Diet ordered that includes a Diet selection(s) AND a Behavioral Tray selection for Safe Tray - with utensils, or Safe Tray - NO utensils       Plan: Petition for commitment was filed with [facility]. Family would like for pt to discharge back to their care.  Rationale for change in precautions or plan: N/A

## 2018-04-06 NOTE — PROGRESS NOTES
[facility], [place]   Psychiatric Progress Note        Interim History:   The patient's care was discussed with the treatment team during the daily team meeting and/or staff's chart notes were reviewed.  Staff report patient has been taking most medications. Family is considering taking him home.     The patient denies any pain or SI. Denies problems with mood or sleep. Answers all these questions with a shake of his head. Not as verbal as before but pleasant.          Medications:       OLANZapine  20 mg Oral At Bedtime     sennosides  10 mL Oral BID     gabapentin  200 mg Oral TID     valproic acid  500 mg Oral At Bedtime     sulfaSALAzine  1,000 mg Oral BID     furosemide  10 mg Oral Q Mon Wed Fri AM     multivitamin, therapeutic with minerals  1 tablet Oral Once     tamsulosin  0.4 mg Oral Daily          Allergies:   No Known Allergies       Labs:     No results found for this or any previous visit (from the past 24 hour(s)).       Psychiatric Examination:     /68  Pulse 76  Temp 96.4  F (35.8  C)  Resp 16  Wt 62.8 kg (138 lb 8 oz)  SpO2 93%  Weight is 138 lbs 8 oz  There is no height or weight on file to calculate BMI.  Orthostatic Vitals       Most Recent      Sitting Orthostatic /65 04/02 0807    Sitting Orthostatic Pulse (bpm) 82 04/02 0807    Standing Orthostatic /72 04/02 0807    Standing Orthostatic Pulse (bpm) 86 04/02 0807            Appearance: awake  Attitude:  somewhat cooperative  Eye Contact:  poor   Mood:  denies depression  Affect:  intensity is flat  Speech:  paucity of speech, does utter some phrases  Psychomotor Behavior:  physical retardation  Thought Process:  evidence of thought blocking present  Associations:  no loose associations  Thought Content:  no evidence of suicidal ideation or homicidal ideation and no evidence of psychotic thought  Insight:  partial  Judgement:  limited  Oriented to:  person  Attention Span and Concentration:   poor  Recent and Remote Memory:  limited    Clinical Global Impressions  First:  Considering your total clinical experience with this particular patient population, how severe are the patient's symptoms at this time?: 7 (04/01/18 1140)  Compared to the patient's condition at the START of treatment, this patient's condition is:: 4 (04/01/18 1140)  Most recent:  Considering your total clinical experience with this particular patient population, how severe are the patient's symptoms at this time?: 7 (04/01/18 1140)  Compared to the patient's condition at the START of treatment, this patient's condition is:: 4 (04/01/18 1140)    # Pain Assessment:  Current Pain Score 3/31/2018   Patient currently in pain? COREY   Pain score (0-10) -   - Ronald currently denies any pain.   - Please see the plan for pain management as documented above           Precautions:     Behavioral Orders   Procedures     Code 1 - Restrict to Unit     Elopement precautions     Routine Programming     As clinically indicated     Status 15     Every 15 minutes.     Status Individual Observation     Order Specific Question:   CONTINUOUS 24 hours / day     Answer:   Distance and Exceptions     Order Specific Question:   Distance     Answer:   5 feet     Order Specific Question:   Exceptions     Answer:   none     Suicide precautions     Patients on Suicide Precautions should have a Combination Diet ordered that includes a Diet selection(s) AND a Behavioral Tray selection for Safe Tray - with utensils, or Safe Tray - NO utensils            DIagnoses:   Bipolar disorder Type 1 versus 2  Developmental delay         Plan:     1) Continue Zyprexa. Changed to liquid formulation.   2) VPA 500mg liquid Qday started on admission.   3) Gabapentin 200mg liquid TID started on admission.   4) Patient admitted on a 72-hour hold. Filed petition due to current lack of capacity and guardian. Supported by [facility]. First hearing on Monday.   5) Continue SIO precautions.    6) Disposition to be determined. Family is considering taking him home. Patient to show improvement in terms of psychosis and aggression prior to discharge.

## 2018-04-06 NOTE — PROGRESS NOTES
04/06/18 1600   General Information   Special Considerations completed on 4-7-18   Clinical Impression   Affect Flat   Orientation Other (see comments)  (did not say any words verbally)   Appearance and ADLs Completes ADLs with cues;Needs physical assistance;Needs further assessment   Attention to Internal Stimuli Appears distracted/preoccupied internally   Interaction Skills Other (see comments)  (no verbal responses)   Ability to Communicate Needs Other (see comments)  (some nonverbally)   Verbal Content Other (see comments)  (no verbal response)   Ability to Maintain Boundaries Maintains appropriate physical boundaries   Participation Other (see comments)  (needs 1:1 encouragement and support)   Concentration Concentrates 10-20 minutes   Ability to Concentrate Easily distracted   Follows and Comprehends Directions Needs direction simplified;Follows 1 step with repeats;Other (see comments)  (and with assistance and encouragement)   Memory Needs further assessment   Organization Needs 1:1 staff assistance   Decision Making Needs choices limited to 2 choices   Planning and Problem Solving Needs assistance   Ability to Apply and Learn Concepts Other (see comments)  (needs assistance to apply)   Frustrations / Stress Tolerance Indirect responses to frustration   Level of Insight No insight   Self Esteem Needs further assessment   Social Supports Needs further assessment   General Observation/Plan   General Observations/Plan See Comments   Attended 2 of 2 OT groups, he was offered coloring or working on a puzzle which he shook his head he wanted to do the puzzle. This puzzle was highly structured with 9 block pieces of pictures of 9 vehicles that were placed into an identical picture of the puzzle piece, inserted into the board slot. Once the pieces were placed out of the puzzle board, he needed assistance in finding the solutions of where the puzzle pieces would fit . With cues, and repetition of cues and  encouragement, he worked on the puzzle, taking the pieces out and trying to find the location of where they would fit for 2 groups of time, nearly 2 hours. He appeared calm, comfortable, interested. The puzzle was left on the unit for him to work on if interested. When he left the group, noted he had appeared to have been incontinent with a puddle on the floor. Psych Assoc assisted him changing to dry clothes. He appears quite limited with new learning, and problem solving though seemed comfortable working on this task for such an extended period of time.

## 2018-04-06 NOTE — PROGRESS NOTES
"Occupational Therapy Initial Note:         Pt attended Occupational Therapy group today with 1:1 staff.  Pt first sat off by window by himself and did not want to try any project.  Staff asked pt if he would like some markers and pt indicated he did.  Pt was provided with poster and markers.  Pt spent the rest of the group drawing \"C\" shapes on several pieces of paper. Pt came back into room after group was done but was unable to indicate what he was in there for.  Pt was redirected to [facility], where he went willingly with 1:1 staff.  Pt will continue to be encouraged to attend groups for further assessment and to address goals identified on plan of care.  "

## 2018-04-07 PROCEDURE — 25000132 ZZH RX MED GY IP 250 OP 250 PS 637: Performed by: PSYCHIATRY & NEUROLOGY

## 2018-04-07 PROCEDURE — 12400005 ZZH R&B MH CRITICAL SENIOR/ADOLESCENT

## 2018-04-07 RX ADMIN — GABAPENTIN 200 MG: 250 SUSPENSION ORAL at 14:26

## 2018-04-07 RX ADMIN — SENNOSIDES A AND B 10 ML: 415.36 LIQUID ORAL at 09:00

## 2018-04-07 RX ADMIN — OLANZAPINE 20 MG: 5 TABLET, ORALLY DISINTEGRATING ORAL at 19:57

## 2018-04-07 RX ADMIN — VALPROIC ACID 500 MG: 250 SOLUTION ORAL at 19:58

## 2018-04-07 RX ADMIN — GABAPENTIN 200 MG: 250 SUSPENSION ORAL at 19:57

## 2018-04-07 RX ADMIN — GABAPENTIN 200 MG: 250 SUSPENSION ORAL at 08:59

## 2018-04-07 ASSESSMENT — ACTIVITIES OF DAILY LIVING (ADL)
HYGIENE/GROOMING: PROMPTS
ORAL_HYGIENE: PROMPTS
GROOMING: PROMPTS
DRESS: WITH ASSISTANCE
DRESS: PROMPTS
LAUNDRY: UNABLE TO COMPLETE
ORAL_HYGIENE: PROMPTS

## 2018-04-07 NOTE — PLAN OF CARE
Problem: Behavioral Disturbance  Goal: Behavioral Disturbance  Pt able to maintain a reality oriented conversation.    Pt responds appropriately to redirection for aggressive, intrusive behavior.    Pt attends groups without difficulty, responding appropriately to the activities of the group.    Pt completes ADL's each shift.    Pt's intake of fluids and nutrition is adequate.  Signs and symptoms of listed problems will be absent or manageable by discharge or transition of care.     Attended OT group with staff escort. Shook head yes when offered to work on a puzzle. Problem solving was minimal to absent with added verbal cues of where to place the puzzle piece into shaped slot. He appeared more sleepy with eyes closing for a minute or so at a time on occasion. Easy to rouse with his name called and him shaking his head no when asked if he was tired. Appears calm and comfortable.

## 2018-04-07 NOTE — PLAN OF CARE
Problem: Behavioral Disturbance  Goal: Behavioral Disturbance  Pt able to maintain a reality oriented conversation.    Pt responds appropriately to redirection for aggressive, intrusive behavior.    Pt attends groups without difficulty, responding appropriately to the activities of the group.    Pt completes ADL's each shift.    Pt's intake of fluids and nutrition is adequate.  Signs and symptoms of listed problems will be absent or manageable by discharge or transition of care.   Outcome: Improving  No aggressive behavior. Intake good.  Cooperative with taking medication.

## 2018-04-07 NOTE — PROGRESS NOTES
04/07/18 1405   Behavioral Health   Hallucinations (maggie)   Thinking (maggie)   Orientation person: oriented   Memory (maggie)   Insight (maggie)   Judgement impaired   Eye Contact at examiner   Affect blunted, flat   Mood mood is calm   Physical Appearance/Attire attire appropriate to age and situation   Hygiene neglected grooming - unclean body, hair, teeth;body odor   Self Injury (none observed)   Elopement (none observed)   Activity (visible in the mileu )   Speech incoherent   Psychomotor / Gait balanced;steady   Psycho Education   Type of Intervention 1:1 intervention   Response unavailable   Hours 0.5   Activities of Daily Living   Hygiene/Grooming prompts   Oral Hygiene prompts   Dress prompts     Patient had a good shift and was very cooperative. He was visible in the milieu and participated in some groups. However, due to lack of response, I was unable to access for any thoughts of SI and SIB, as well as feelings of depression or anxiety.

## 2018-04-08 PROCEDURE — 25000132 ZZH RX MED GY IP 250 OP 250 PS 637: Performed by: PSYCHIATRY & NEUROLOGY

## 2018-04-08 PROCEDURE — 12400005 ZZH R&B MH CRITICAL SENIOR/ADOLESCENT

## 2018-04-08 RX ORDER — HYDROXYZINE HCL 10 MG/5 ML
25 SOLUTION, ORAL ORAL EVERY 4 HOURS PRN
Status: DISCONTINUED | OUTPATIENT
Start: 2018-04-08 | End: 2018-04-18 | Stop reason: HOSPADM

## 2018-04-08 RX ADMIN — GABAPENTIN 200 MG: 250 SUSPENSION ORAL at 14:07

## 2018-04-08 RX ADMIN — OLANZAPINE 20 MG: 5 TABLET, ORALLY DISINTEGRATING ORAL at 20:23

## 2018-04-08 RX ADMIN — GABAPENTIN 200 MG: 250 SUSPENSION ORAL at 20:22

## 2018-04-08 RX ADMIN — Medication 1000 MG: at 10:11

## 2018-04-08 RX ADMIN — GABAPENTIN 200 MG: 250 SUSPENSION ORAL at 08:32

## 2018-04-08 RX ADMIN — Medication 1000 MG: at 20:23

## 2018-04-08 RX ADMIN — VALPROIC ACID 500 MG: 250 SOLUTION ORAL at 20:22

## 2018-04-08 ASSESSMENT — ACTIVITIES OF DAILY LIVING (ADL)
DRESS: INDEPENDENT
LAUNDRY: UNABLE TO COMPLETE
ORAL_HYGIENE: PROMPTS
ORAL_HYGIENE: PROMPTS
DRESS: INDEPENDENT
GROOMING: PROMPTS
LAUNDRY: UNABLE TO COMPLETE
GROOMING: PROMPTS

## 2018-04-08 NOTE — PROGRESS NOTES
Incontinent of large amount of urine and with smear of BM on the pad,  Assisted by 2 staff's to change, got mad and yelled when his buttocks was being wiped/cleaned.

## 2018-04-08 NOTE — PROGRESS NOTES
Patient visible on the unit for much of the shift with frequent trips to the restroom after one episode of incontinence. Patient calm this shift. Distracted with puzzle and coloring and people watching. COREY for SI or SIB at this time.  NO aggressive behaviors to note.

## 2018-04-08 NOTE — PROGRESS NOTES
Writer contacted pharmacy as patient continues to refuse  tablet/capsules. Pharmacy was contacted and patients flomax and sulfasalazine as well as prn tylenol and hydroxyzine were changed to liquid/suspension form.

## 2018-04-08 NOTE — PROGRESS NOTES
Pt was present in the lounge for meals and was seen walking around the unit throughout the shift. He did take a couple of morning naps. Pt was calm and cooperative with staff. He ate well, watched a little bit of tv, and worked on his puzzle. Overall he had a good morning shift. Pt is nonverbal so it is difficult to check in with him/ask any questions.       04/08/18 1139   Behavioral Health   Hallucinations other (see comment)  (COREY)   Thinking distractable;poor concentration   Orientation person: oriented   Memory other (see comment)  (COREY)   Insight poor   Judgement impaired   Eye Contact into space;at examiner   Affect blunted, flat   Mood mood is calm   Physical Appearance/Attire attire appropriate to age and situation   Hygiene neglected grooming - unclean body, hair, teeth   1. Wish to be Dead (COREY)   2. Non-Specific Active Suicidal Thoughts  (COREY)   Self Injury (COREY)   Elopement (nothing observed)   Activity withdrawn   Speech unable to speak   Medication Sensitivity no observed side effects   Psychomotor / Gait slow;steady   Coping/Psychosocial   Verbalized Emotional State other (see comments)  (COREY - nonverbal)   Psycho Education   Type of Intervention other (see comment)  (nonverbal, did not go to groups)   Response unavailable   Activities of Daily Living   Hygiene/Grooming prompts   Oral Hygiene prompts   Dress independent   Laundry unable to complete   Room Organization independent   Groups   Details did not attend   Behavioral Health Interventions   Behavioral Disturbance maintain safe secure environment;simple, clear language   Social and Therapeutic Interventions (Behavioral Disturbance) encourage participation in therapeutic groups and milieu activities

## 2018-04-09 PROCEDURE — 25000132 ZZH RX MED GY IP 250 OP 250 PS 637: Performed by: PSYCHIATRY & NEUROLOGY

## 2018-04-09 PROCEDURE — 97150 GROUP THERAPEUTIC PROCEDURES: CPT | Mod: GO

## 2018-04-09 PROCEDURE — 99232 SBSQ HOSP IP/OBS MODERATE 35: CPT | Performed by: PSYCHIATRY & NEUROLOGY

## 2018-04-09 PROCEDURE — 12400005 ZZH R&B MH CRITICAL SENIOR/ADOLESCENT

## 2018-04-09 RX ADMIN — Medication 10 MG: at 12:32

## 2018-04-09 RX ADMIN — OLANZAPINE 20 MG: 5 TABLET, ORALLY DISINTEGRATING ORAL at 20:16

## 2018-04-09 RX ADMIN — VALPROIC ACID 500 MG: 250 SOLUTION ORAL at 20:16

## 2018-04-09 RX ADMIN — GABAPENTIN 200 MG: 250 SUSPENSION ORAL at 20:17

## 2018-04-09 RX ADMIN — GABAPENTIN 200 MG: 250 SUSPENSION ORAL at 13:57

## 2018-04-09 RX ADMIN — Medication 1000 MG: at 20:17

## 2018-04-09 RX ADMIN — GABAPENTIN 200 MG: 250 SUSPENSION ORAL at 09:41

## 2018-04-09 RX ADMIN — Medication 1000 MG: at 09:41

## 2018-04-09 ASSESSMENT — ACTIVITIES OF DAILY LIVING (ADL)
GROOMING: PROMPTS
DRESS: SCRUBS (BEHAVIORAL HEALTH)
GROOMING: PROMPTS;WITH ASSISTANCE
DRESS: SCRUBS (BEHAVIORAL HEALTH);WITH ASSISTANCE

## 2018-04-09 NOTE — PLAN OF CARE
"Problem: Behavioral Disturbance  Goal: Behavioral Disturbance  Pt able to maintain a reality oriented conversation.    Pt responds appropriately to redirection for aggressive, intrusive behavior.    Pt attends groups without difficulty, responding appropriately to the activities of the group.    Pt completes ADL's each shift.    Pt's intake of fluids and nutrition is adequate.  Signs and symptoms of listed problems will be absent or manageable by discharge or transition of care.   Outcome: Therapy, progress toward functional goals as expected  Has refused a shower when offered with emphatic \"no\". Does well with being brought to restroom every 2 hours. No aggressive behavior. Med compliant. Appetite good.  Yes/no answers; mostly nonverbal.      "

## 2018-04-09 NOTE — PROGRESS NOTES
Spoke with pt's niece, Brittany, to provide pt update. Brittany reiterated that the family would like pt to discharge to their care. She also reports that the family will be starting the process for petitioning for guardianship.  They would like to eventually get him into a group home.

## 2018-04-09 NOTE — PROGRESS NOTES
[facility], [place]   Psychiatric Progress Note        Interim History:   The patient's care was discussed with the treatment team during the daily team meeting and/or staff's chart notes were reviewed.  Staff report patient has not had any agitation. Could try off SIO. Sleeping and eating well. Works on puzzles in groups.     The patient denies any pain or SI. Denies problems with mood or sleep. Answers all these questions with a shake of his head. Nods to query about having a good weekend. Very pleasant and more attempts at eye contact today.          Medications:       tamsulosin  400 mcg Oral Daily     sulfaSALAzine  1,000 mg Oral BID     OLANZapine  20 mg Oral At Bedtime     sennosides  10 mL Oral BID     gabapentin  200 mg Oral TID     valproic acid  500 mg Oral At Bedtime     furosemide  10 mg Oral Q Mon Wed Fri AM     multivitamin, therapeutic with minerals  1 tablet Oral Once          Allergies:   No Known Allergies       Labs:     No results found for this or any previous visit (from the past 24 hour(s)).       Psychiatric Examination:     /72  Pulse 80  Temp 96.1  F (35.6  C)  Resp 16  Wt 63.9 kg (140 lb 14.4 oz)  SpO2 93%  Weight is 140 lbs 14.4 oz  There is no height or weight on file to calculate BMI.  Orthostatic Vitals       Most Recent      Sitting Orthostatic /65 04/02 0807    Sitting Orthostatic Pulse (bpm) 82 04/02 0807    Standing Orthostatic /72 04/02 0807    Standing Orthostatic Pulse (bpm) 86 04/02 0807            Appearance: awake  Attitude:  more cooperative  Eye Contact:  poor   Mood:  denies depression  Affect:  intensity is flat  Speech:  paucity of speech, does utter some phrases  Psychomotor Behavior:  no evidence of tardive dyskinesia, dystonia, or tics  Thought Process:  concrete  Associations:  no loose associations  Thought Content:  no evidence of suicidal ideation or homicidal ideation and no evidence of psychotic  thought  Insight:  partial  Judgement:  limited  Oriented to:  person  Attention Span and Concentration:  poor  Recent and Remote Memory:  limited    Clinical Global Impressions  First:  Considering your total clinical experience with this particular patient population, how severe are the patient's symptoms at this time?: 7 (04/01/18 1140)  Compared to the patient's condition at the START of treatment, this patient's condition is:: 4 (04/01/18 1140)  Most recent:  Considering your total clinical experience with this particular patient population, how severe are the patient's symptoms at this time?: 7 (04/01/18 1140)  Compared to the patient's condition at the START of treatment, this patient's condition is:: 4 (04/01/18 1140)    # Pain Assessment:  Current Pain Score 4/8/2018   Patient currently in pain? COREY   Pain score (0-10) -   Pain location -   - Ronald currently denies any pain.   - Please see the plan for pain management as documented above           Precautions:     Behavioral Orders   Procedures     Code 1 - Restrict to Unit     Elopement precautions     Routine Programming     As clinically indicated     Status 15     Every 15 minutes.     Suicide precautions     Patients on Suicide Precautions should have a Combination Diet ordered that includes a Diet selection(s) AND a Behavioral Tray selection for Safe Tray - with utensils, or Safe Tray - NO utensils            DIagnoses:   Bipolar disorder Type 1 versus 2  Developmental delay         Plan:     1) Continue Zyprexa. Changed to liquid formulation.   2) VPA 500mg liquid Qday started on admission.   3) Gabapentin 200mg liquid TID started on admission.   4) Patient admitted on a 72-hour hold. Filed petition due to current lack of capacity and guardian. Supported by [facility]. First hearing today.   5) Will try off SIO precautions.   6) Disposition to be determined. Family is considering taking him home. Patient to show improvement in terms of psychosis and  aggression prior to discharge.

## 2018-04-09 NOTE — PLAN OF CARE
Problem: Behavioral Disturbance  Goal: Behavioral Disturbance  Pt able to maintain a reality oriented conversation.    Pt responds appropriately to redirection for aggressive, intrusive behavior.    Pt attends groups without difficulty, responding appropriately to the activities of the group.    Pt completes ADL's each shift.    Pt's intake of fluids and nutrition is adequate.  Signs and symptoms of listed problems will be absent or manageable by discharge or transition of care.     Attended 1 of 2 OT groups. He offered no verbal responses. He worked for brief periods of time on a puzzle, placed pieces in the correct space of the 9 options at a quicker pace. Shook head yes that he wanted to color though scribbled for less than a few seconds. He was successful in taking the cap off the marker correctly, did not replace it when done though also made a decision on a color to use and indicated a choice when asked which he wanted. Appeared calm.

## 2018-04-09 NOTE — PROGRESS NOTES
Pt has been pleasant and cooperative during medication administration.   Pt continues to refuse cares at times. Pt is disheveled and has poor hygiene. Pt needs assistance with cares and does not always allow staff to assist him.   Pt has been incontinent at times and has refused to go to the bathroom at times.   SIO was discontinued and patient has did well with less supervision. Pt has been in the lounge most of the shift.  Pt is non verbal at times and many times does not respond to questions.   Pt is eating and drinking well.    1430- Pt allowed male psych associate to assist in bathroom. Pt was incontinent and continent of urine as well incontinent of a small amount of soft stool. Pt refused to change his shirt.

## 2018-04-09 NOTE — PROGRESS NOTES
Spoke with Yvette Mendoza ([phone]) from [facility] attorney office.  Reported that pt will not be able to appear in court.  Yvette will work on making plans for ITV.

## 2018-04-10 PROCEDURE — 99232 SBSQ HOSP IP/OBS MODERATE 35: CPT | Performed by: PSYCHIATRY & NEUROLOGY

## 2018-04-10 PROCEDURE — 25000132 ZZH RX MED GY IP 250 OP 250 PS 637: Performed by: PSYCHIATRY & NEUROLOGY

## 2018-04-10 PROCEDURE — 12400005 ZZH R&B MH CRITICAL SENIOR/ADOLESCENT

## 2018-04-10 PROCEDURE — H2032 ACTIVITY THERAPY, PER 15 MIN: HCPCS

## 2018-04-10 RX ADMIN — Medication 1000 MG: at 21:46

## 2018-04-10 RX ADMIN — GABAPENTIN 200 MG: 250 SUSPENSION ORAL at 14:25

## 2018-04-10 RX ADMIN — GABAPENTIN 200 MG: 250 SUSPENSION ORAL at 21:44

## 2018-04-10 RX ADMIN — OLANZAPINE 20 MG: 5 TABLET, ORALLY DISINTEGRATING ORAL at 21:44

## 2018-04-10 RX ADMIN — VALPROIC ACID 500 MG: 250 SOLUTION ORAL at 21:43

## 2018-04-10 RX ADMIN — Medication 1000 MG: at 08:34

## 2018-04-10 RX ADMIN — GABAPENTIN 200 MG: 250 SUSPENSION ORAL at 08:34

## 2018-04-10 RX ADMIN — ACETAMINOPHEN 650 MG: 325 SOLUTION ORAL at 22:08

## 2018-04-10 ASSESSMENT — ACTIVITIES OF DAILY LIVING (ADL)
LAUNDRY: UNABLE TO COMPLETE
DRESS: SCRUBS (BEHAVIORAL HEALTH)
GROOMING: PROMPTS
ORAL_HYGIENE: PROMPTS
ORAL_HYGIENE: PROMPTS
DRESS: SCRUBS (BEHAVIORAL HEALTH)
GROOMING: PROMPTS

## 2018-04-10 NOTE — PROGRESS NOTES
[facility], [place]   Psychiatric Progress Note        Interim History:   The patient's care was discussed with the treatment team during the daily team meeting and/or staff's chart notes were reviewed.  Staff report patient has been more engaged and talkative.     The patient denies any pain or SI. Denies problems with mood or sleep. Answers all these questions with a shake of his head. Smiles at provider.          Medications:       tamsulosin  400 mcg Oral Daily     sulfaSALAzine  1,000 mg Oral BID     OLANZapine  20 mg Oral At Bedtime     sennosides  10 mL Oral BID     gabapentin  200 mg Oral TID     valproic acid  500 mg Oral At Bedtime     furosemide  10 mg Oral Q Mon Wed Fri AM     multivitamin, therapeutic with minerals  1 tablet Oral Once          Allergies:   No Known Allergies       Labs:     No results found for this or any previous visit (from the past 24 hour(s)).       Psychiatric Examination:     /73  Pulse 81  Temp 97.1  F (36.2  C) (Tympanic)  Resp 16  Wt 65.9 kg (145 lb 3.2 oz)  SpO2 93%  Weight is 145 lbs 3.2 oz  There is no height or weight on file to calculate BMI.  Orthostatic Vitals       Most Recent      Sitting Orthostatic /65 04/02 0807    Sitting Orthostatic Pulse (bpm) 82 04/02 0807    Standing Orthostatic /72 04/02 0807    Standing Orthostatic Pulse (bpm) 86 04/02 0807            Appearance: awake  Attitude:  more cooperative  Eye Contact:  poor   Mood:  denies depression  Affect:  intensity is flat  Speech:  paucity of speech, does utter some phrases  Psychomotor Behavior:  no evidence of tardive dyskinesia, dystonia, or tics  Thought Process:  concrete  Associations:  no loose associations  Thought Content:  no evidence of suicidal ideation or homicidal ideation and no evidence of psychotic thought  Insight:  partial  Judgement:  limited  Oriented to:  person  Attention Span and Concentration:  poor  Recent and Remote Memory:   limited    Clinical Global Impressions  First:  Considering your total clinical experience with this particular patient population, how severe are the patient's symptoms at this time?: 7 (04/01/18 1140)  Compared to the patient's condition at the START of treatment, this patient's condition is:: 4 (04/01/18 1140)  Most recent:  Considering your total clinical experience with this particular patient population, how severe are the patient's symptoms at this time?: 7 (04/01/18 1140)  Compared to the patient's condition at the START of treatment, this patient's condition is:: 4 (04/01/18 1140)    # Pain Assessment:  Current Pain Score 4/8/2018   Patient currently in pain? COREY   Pain score (0-10) -   Pain location -   - Ronald currently denies any pain.            Precautions:     Behavioral Orders   Procedures     Code 1 - Restrict to Unit     Routine Programming     As clinically indicated     Status 15     Every 15 minutes.          DIagnoses:   Bipolar disorder Type 1 versus 2  Developmental delay         Plan:     1) Continue Zyprexa. Changed to liquid formulation.   2) VPA 500mg liquid Qday started on admission.   3) Gabapentin 200mg liquid TID started on admission.   4) Patient admitted on a 72-hour hold. Filed petition due to current lack of capacity and guardian. Supported by [facility]. First hearing was on Monday.   5) Patient doing well off SIO.   6) Disposition to be determined. Family is considering taking him home. Patient to show improvement in terms of psychosis and aggression prior to discharge.

## 2018-04-10 NOTE — PROGRESS NOTES
"More verbal, more animated this evening. At times, gives answers with a few words. Keeps busy with coloring, rearranging puzzle pieces. Spontaneously would get up and walk around. He is cooperative with using rest room when directed yet is very clear when approached for a shower \"No shower!\" Appetite excellent. No aggressive behavior.  "

## 2018-04-10 NOTE — PROGRESS NOTES
Pt verbal responses minimal. Pt tends to speak during outbursts, such as refusing a care. Pt was found in another Pt's room (366). Pt incontinent of urine and initially refused to change clothes. Eventually changed clothes through prompts and redirection. Pt present in milieu thought withdrawn. Pt responses minimal, unable to assess during check-in.       04/10/18 1158   Behavioral Health   Thinking other (see comment)  (COREY)   Orientation person: oriented   Memory (COREY)   Insight poor   Judgement impaired   Eye Contact at examiner;into space;out of corner of eyes   Affect full range affect;angry   Mood mood is calm;irritable   Physical Appearance/Attire disheveled   Hygiene neglected grooming - unclean body, hair, teeth   Suicidality other (see comments)  (COREY)   1. Wish to be Dead (COREY)   Self Injury other (see comment)  (COREY)   Elopement (nONE)   Activity withdrawn   Speech other (see comments)  (Minimal speech)   Medication Sensitivity no stated side effects;no observed side effects   Psychomotor / Gait balanced;steady   Safety   Suicidality Status 15   Psycho Education   Type of Intervention 1:1 intervention   Response unavailable   Hours 0.5   Treatment Detail Check-in   Activities of Daily Living   Hygiene/Grooming prompts   Oral Hygiene prompts   Dress scrubs (behavioral health)   Room Organization unable   Activity   Activity Assistance Provided assistance, stand-by   Behavioral Health Interventions   Behavioral Disturbance maintain safety precautions;maintain safe secure environment;monitor need to revise level of observation;simple, clear language;assist in development of alternative methods of expressive communication;encourage clear communication of needs;redirection of aggressive behaviors;redirection of intrusive behaviors;assist patient in developing safety plan;encourage participation / independence with adls;provide emotional support;establish therapeutic relationship;assist with developing &  utilizing healthy coping strategies;provide positive feedback for use of effective coping skills;build upon strengths;assess patient response to medication;monitor need for prn medication;monitor confusion, memory loss, decision making ability and reorient / intervent as needed   Social and Therapeutic Interventions (Behavioral Disturbance) encourage participation in therapeutic groups and milieu activities

## 2018-04-11 PROCEDURE — 25000132 ZZH RX MED GY IP 250 OP 250 PS 637: Performed by: PSYCHIATRY & NEUROLOGY

## 2018-04-11 PROCEDURE — 99232 SBSQ HOSP IP/OBS MODERATE 35: CPT | Performed by: PSYCHIATRY & NEUROLOGY

## 2018-04-11 PROCEDURE — 12400005 ZZH R&B MH CRITICAL SENIOR/ADOLESCENT

## 2018-04-11 PROCEDURE — 25000132 ZZH RX MED GY IP 250 OP 250 PS 637: Performed by: NURSE PRACTITIONER

## 2018-04-11 RX ADMIN — OLANZAPINE 20 MG: 5 TABLET, ORALLY DISINTEGRATING ORAL at 20:27

## 2018-04-11 RX ADMIN — GABAPENTIN 200 MG: 250 SUSPENSION ORAL at 20:24

## 2018-04-11 RX ADMIN — GABAPENTIN 200 MG: 250 SUSPENSION ORAL at 13:57

## 2018-04-11 RX ADMIN — GABAPENTIN 200 MG: 250 SUSPENSION ORAL at 09:01

## 2018-04-11 RX ADMIN — Medication 1000 MG: at 20:26

## 2018-04-11 RX ADMIN — Medication 1000 MG: at 09:50

## 2018-04-11 RX ADMIN — VALPROIC ACID 500 MG: 250 SOLUTION ORAL at 20:27

## 2018-04-11 RX ADMIN — TAMSULOSIN HYDROCHLORIDE 400 MCG: 0.4 CAPSULE ORAL at 09:01

## 2018-04-11 ASSESSMENT — ACTIVITIES OF DAILY LIVING (ADL)
GROOMING: WITH ASSISTANCE;WITH SUPERVISION
LAUNDRY: UNABLE TO COMPLETE
DRESS: SCRUBS (BEHAVIORAL HEALTH)
ORAL_HYGIENE: WITH ASSISTANCE;WITH SUPERVISION
GROOMING: WITH ASSISTANCE
LAUNDRY: UNABLE TO COMPLETE
ORAL_HYGIENE: WITH ASSISTANCE
DRESS: SCRUBS (BEHAVIORAL HEALTH);WITH ASSISTANCE

## 2018-04-11 NOTE — PROGRESS NOTES
"Pt reported headache. Tylenol x 1. Pt assisted with stool incontinence. Hesitant to allow help with cleaning but did allow. When frustrated, stated, \"you're hurting my soul.\"  "

## 2018-04-11 NOTE — PROGRESS NOTES
"[facility], [place]   Psychiatric Progress Note        Interim History:   The patient's care was discussed with the treatment team during the daily team meeting and/or staff's chart notes were reviewed.  Staff report patient slept 6.5 hours. Was incontinent X2. Does not like showers.     The patient says that his mood is \"the same.\" Does not answer questions about sleep, suicidal thoughts or pain. Works on a puzzle during the interview.     [facility]'s developmental delay needs and they may change it to a \"DD commitment\".          Medications:       tamsulosin  400 mcg Oral Daily     sulfaSALAzine  1,000 mg Oral BID     OLANZapine  20 mg Oral At Bedtime     sennosides  10 mL Oral BID     gabapentin  200 mg Oral TID     valproic acid  500 mg Oral At Bedtime     furosemide  10 mg Oral Q Mon Wed Fri AM     multivitamin, therapeutic with minerals  1 tablet Oral Once          Allergies:   No Known Allergies       Labs:     No results found for this or any previous visit (from the past 24 hour(s)).       Psychiatric Examination:     /73  Pulse 81  Temp 97.5  F (36.4  C) (Tympanic)  Resp 16  Wt 65.9 kg (145 lb 3.2 oz)  SpO2 93%  Weight is 145 lbs 3.2 oz  There is no height or weight on file to calculate BMI.  Orthostatic Vitals       Most Recent      Sitting Orthostatic /65 04/02 0807    Sitting Orthostatic Pulse (bpm) 82 04/02 0807    Standing Orthostatic /72 04/02 0807    Standing Orthostatic Pulse (bpm) 86 04/02 0807            Appearance: awake  Attitude:  somewhat cooperative  Eye Contact:  poor   Mood:  denies depression  Affect:  intensity is flat  Speech:  paucity of speech, does utter some phrases  Psychomotor Behavior:  no evidence of tardive dyskinesia, dystonia, or tics  Thought Process:  concrete  Associations:  no loose associations  Thought Content:  no evidence of suicidal ideation or homicidal ideation and no evidence of " psychotic thought  Insight:  partial  Judgement:  limited  Oriented to:  person  Attention Span and Concentration:  poor  Recent and Remote Memory:  limited    Clinical Global Impressions  First:  Considering your total clinical experience with this particular patient population, how severe are the patient's symptoms at this time?: 7 (04/01/18 1140)  Compared to the patient's condition at the START of treatment, this patient's condition is:: 4 (04/01/18 1140)  Most recent:  Considering your total clinical experience with this particular patient population, how severe are the patient's symptoms at this time?: 7 (04/01/18 1140)  Compared to the patient's condition at the START of treatment, this patient's condition is:: 4 (04/01/18 1140)    # Pain Assessment:  Current Pain Score 4/10/2018   Patient currently in pain? yes   Pain score (0-10) -   Pain location -   Pain descriptors Headache   - Ronald currently denies any pain.            Precautions:     Behavioral Orders   Procedures     Code 1 - Restrict to Unit     Routine Programming     As clinically indicated     Status 15     Every 15 minutes.          DIagnoses:   Bipolar disorder Type 1 versus 2  Developmental delay         Plan:     1) Continue Zyprexa. Changed to liquid formulation.   2) VPA 500mg liquid Qday started on admission.   3) Gabapentin 200mg liquid TID started on admission.   4) Patient admitted on a 72-hour hold. Filed petition due to current lack of capacity and guardian. Supported by [facility]. First hearing was on Monday. Did speak to [facility] about patient's current state and his progress. Likely will have final hearing tomorrow.   5) Patient doing well off SIO.   6) Disposition to be determined. Family is considering taking him home. Patient to show improvement in terms of psychosis and aggression prior to discharge.

## 2018-04-11 NOTE — PLAN OF CARE
Problem: Patient Care Overview  Goal: Team Discussion  Team Plan:    BEHAVIORAL TEAM DISCUSSION    Participants: Magaly Zapata RN, CLRAA Bassett, Elisabet Mathew, OT  Progress: Some improvement  Continued Stay Criteria/Rationale: Will likely discharge soon to family  Medical/Physical: See medical notes  Precautions:   Behavioral Orders   Procedures     Code 1 - Restrict to Unit     Routine Programming     As clinically indicated     Status 15     Every 15 minutes.     Plan: Petition for commitment was initiated.  Today, petition was dropped.  Pt will discharge home to family  Rationale for change in precautions or plan: N/A      Problem: General Plan of Care (Inpatient Behavioral)  Goal: Team Discussion  Team Plan:    BEHAVIORAL TEAM DISCUSSION    Participants: Magaly Zapata RN, CLARA Bassett, Elisabet Mathew, OT  Progress: Some improvement  Continued Stay Criteria/Rationale: Will likely discharge soon to family  Medical/Physical: See medical notes  Precautions:   Behavioral Orders   Procedures     Code 1 - Restrict to Unit     Routine Programming     As clinically indicated     Status 15     Every 15 minutes.     Plan: Petition for commitment was initiated.  Today, petition was dropped.  Pt will discharge home to family  Rationale for change in precautions or plan: N/A

## 2018-04-11 NOTE — PLAN OF CARE
"Problem: Social/Occupational/Functional Impairment (Disruptive Behavior) (Adult)  Goal: Improved Social/Occupational/Functional Skills (Disruptive Behavior)  Outcome: No Change    Patient's mood, anxiety, thoughts and behavior continue to be assessed.   Pt isnt  progressing toward goals. Encourage participation in groups but patient denied.  He slept 6.5 hours last night. Appetite is good.   Ronald has been incontinent of urine and stool 3 times this shift. Continues to be medication compliant. Patient showered this shift with assistance and a lot of encouragement  from staff. He is isolative and needs frequent reminders to use the restroom. Unable to assess suicidality and mood. Writer attempted to clip his toe nails this shift but he refused \"I dont want to you to clip them\" Will continue to monitor.         "

## 2018-04-11 NOTE — PROGRESS NOTES
PT has court hearing tomorrow at 9am.    Update:  Spoke with Yvette Mendoza (, [phone]) and Dr. Torres about hearing tomorrow. Yvette Mendoza reports that she does not have adequate information to proceed with commitment. Dr. Torres recommends that petition for commitment is dropped. Left voice message for Yvette Mendoza.    Update:  Spoke with Haydee Mendoza who is not totally comfortable withdrawing the petition for commitment. Would like to discuss with Dr. Torres. Notified him to give him Yvette's contact information.

## 2018-04-12 PROCEDURE — 97150 GROUP THERAPEUTIC PROCEDURES: CPT | Mod: GO

## 2018-04-12 PROCEDURE — 12400007 ZZH R&B MH INTERMEDIATE UMMC

## 2018-04-12 PROCEDURE — 25000132 ZZH RX MED GY IP 250 OP 250 PS 637: Performed by: PSYCHIATRY & NEUROLOGY

## 2018-04-12 PROCEDURE — 25000132 ZZH RX MED GY IP 250 OP 250 PS 637: Performed by: NURSE PRACTITIONER

## 2018-04-12 RX ADMIN — GABAPENTIN 200 MG: 250 SUSPENSION ORAL at 09:47

## 2018-04-12 RX ADMIN — Medication 1000 MG: at 21:59

## 2018-04-12 RX ADMIN — OLANZAPINE 20 MG: 5 TABLET, ORALLY DISINTEGRATING ORAL at 20:12

## 2018-04-12 RX ADMIN — GABAPENTIN 200 MG: 250 SUSPENSION ORAL at 20:12

## 2018-04-12 RX ADMIN — VALPROIC ACID 500 MG: 250 SOLUTION ORAL at 20:12

## 2018-04-12 RX ADMIN — Medication 1000 MG: at 09:48

## 2018-04-12 RX ADMIN — TAMSULOSIN HYDROCHLORIDE 400 MCG: 0.4 CAPSULE ORAL at 09:47

## 2018-04-12 RX ADMIN — GABAPENTIN 200 MG: 250 SUSPENSION ORAL at 14:51

## 2018-04-12 ASSESSMENT — ACTIVITIES OF DAILY LIVING (ADL)
GROOMING: WITH SUPERVISION
DRESS: SCRUBS (BEHAVIORAL HEALTH)
ORAL_HYGIENE: WITH ASSISTANCE
GROOMING: WITH ASSISTANCE
DRESS: SCRUBS (BEHAVIORAL HEALTH)
ORAL_HYGIENE: WITH ASSISTANCE
ORAL_HYGIENE: WITH ASSISTANCE
GROOMING: WITH ASSISTANCE
DRESS: SCRUBS (BEHAVIORAL HEALTH)

## 2018-04-12 NOTE — PROGRESS NOTES
Pt was up and ambulating around the unit, affect bright smiling upon approach. Appetite good feeds self. Fluid intake good. Medication complaint. Pt had episode of loose stool, Senokot held this am. Pt was assisted to use toilet and get washed up after loose BM. Wears pull ups.

## 2018-04-12 NOTE — PLAN OF CARE
Problem: Behavioral Disturbance  Goal: Behavioral Disturbance  Pt able to maintain a reality oriented conversation.    Pt responds appropriately to redirection for aggressive, intrusive behavior.    Pt attends groups without difficulty, responding appropriately to the activities of the group.    Pt completes ADL's each shift.    Pt's intake of fluids and nutrition is adequate.  Signs and symptoms of listed problems will be absent or manageable by discharge or transition of care.   Outcome: No Change  Pt is unable to maintain a reality based conversation, pt talks very little and does not respond to questions. Pt will respond to a soft calm voice. Pt attends groups and participates with simple tasks. Pt needs to be redirected at times to where his room and the bathroom are.

## 2018-04-12 NOTE — PLAN OF CARE
Problem: General Plan of Care (Inpatient Behavioral)  Goal: Individualization/Patient Specific Goal (IP Behavioral)  The patient and/or their representative will achieve their patient-specific goals related to the plan of care.    The patient-specific goals include: Illness Management Recovery model: Objectives    Patient will identify reason(s) for hospitalization from their perspective.  Patient will identify a minimum of three goals for discharge.  Patient will identify a minimum of three triggers that may increase their symptoms.  Patient will identify a minimum of three coping skills they can do to stay well.   Patient will identify their support system to demonstrate readiness for discharge.  Outcome: No Change  Illness Management Recovery model:  Feedback.    Patient identified the following people they would like to receive feedback from if/when they see early warning signs:   Pt unable to receive feedback  Friend(s)     Family(s):   Partner/Spouse:    Support Group Member(s):     Co-Worker(s):

## 2018-04-12 NOTE — PLAN OF CARE
Problem: Behavioral Disturbance  Goal: Behavioral Disturbance  Pt able to maintain a reality oriented conversation.    Pt responds appropriately to redirection for aggressive, intrusive behavior.    Pt attends groups without difficulty, responding appropriately to the activities of the group.    Pt completes ADL's each shift.    Pt's intake of fluids and nutrition is adequate.  Signs and symptoms of listed problems will be absent or manageable by discharge or transition of care.     Attended 1 of 1 OT groups on invitation. He followed author to the group room, shook head yes in wanting to work on puzzles. He was more successful with 9 piece individual slot puzzles, accomplishing assembling the familiar pieces quicker than previously. He seemed aware of others when a peer needed to get something on the floor under where his feet were and he moved and picked it up and gave it to her on his own initiative. He offered no verbal answers. Nodded yes to questions directed to him. Problem solving continues to appear limited with unfamiliar and more complex tasks. He seems interested in being involved and to participate in opportunities that are familiar and highly structured. Appears calm and relaxed.

## 2018-04-12 NOTE — PROGRESS NOTES
Pt did not participate in 1:1 check-in. Pt mood is calm, at times refuses requests w/ quick outbursts. Pt generally withdrawn, occupies time sitting in lounge or wandering palacios. In morning Pt entered other Pt room. Pt incontinent of urine and stool.       04/12/18 1352   Behavioral Health   Hallucinations denies / not responding to hallucinations   Thinking other (see comment)  (COREY)   Orientation person: oriented   Memory other (see comment)  (COREY)   Insight other (see comment)  (CROEY)   Judgement impaired  (COREY)   Eye Contact out of corner of eyes;into space   Affect blunted, flat;irritable   Mood mood is calm;irritable   Physical Appearance/Attire disheveled   Hygiene neglected grooming - unclean body, hair, teeth   Suicidality other (see comments)  (COREY)   1. Wish to be Dead (COREY)   2. Non-Specific Active Suicidal Thoughts  (COREY)   Self Injury other (see comment)  (COREY)   Elopement (None)   Activity withdrawn   Speech other (see comments)  (Primarily non-verbal, tends to speak in outburts)   Medication Sensitivity no observed side effects;no stated side effects   Psychomotor / Gait balanced;steady   Safety   Suicidality Status 15   Psycho Education   Type of Intervention 1:1 intervention   Response unavailable   Hours 0.5   Treatment Detail Check-in   Activities of Daily Living   Hygiene/Grooming with assistance   Oral Hygiene with assistance   Dress scrubs (behavioral health)   Room Organization unable   Activity   Activity Assistance Provided assistance, stand-by   Behavioral Health Interventions   Behavioral Disturbance maintain safety precautions;maintain safe secure environment;simple, clear language;assist in development of alternative methods of expressive communication;encourage clear communication of needs;redirection of intrusive behaviors;assist patient in developing safety plan;encourage participation / independence with adls;provide emotional support;establish therapeutic relationship;provide  positive feedback for use of effective coping skills;build upon strengths;assist with developing & utilizing healthy coping strategies;assess patient response to medication;monitor need for prn medication;monitor confusion, memory loss, decision making ability and reorient / intervent as needed   Social and Therapeutic Interventions (Behavioral Disturbance) encourage socialization with peers;encourage effective boundaries with peers

## 2018-04-12 NOTE — PROGRESS NOTES
Pt present in the milieu throughout the evening, primarily working on puzzles, occasionally falling asleep in chair; no apparent depression or anxiety; COREY SI; speaks minimally, one word at a time--often nods or shakes head for yes or no; incontinent earlier in the evening (requires assistance cleaning up/ changing); good appetite       04/11/18 2044   Behavioral Health   Hallucinations denies / not responding to hallucinations   Thinking other (see comment)  (COREY)   Orientation person: oriented   Memory other (see comment)  (COREY)   Insight poor   Judgement impaired   Eye Contact into space   Affect blunted, flat   Mood mood is calm   Physical Appearance/Attire disheveled   Hygiene neglected grooming - unclean body, hair, teeth   Suicidality other (see comments)  (COREY)   Self Injury other (see comment)  (none observed)   Elopement (none)   Activity withdrawn   Speech other (see comments)  (minimal speaking)   Medication Sensitivity no observed side effects;no stated side effects   Psychomotor / Gait balanced;steady   Psycho Education   Type of Intervention 1:1 intervention   Response unavailable   Hours 0.5   Treatment Detail check in    Activities of Daily Living   Hygiene/Grooming with assistance   Oral Hygiene with assistance   Dress scrubs (behavioral health);with assistance   Laundry unable to complete   Room Organization unable   Activity   Activity Assistance Provided assistance, stand-by   Behavioral Health Interventions   Behavioral Disturbance maintain safety precautions;monitor need to revise level of observation;maintain safe secure environment;reality orientation;simple, clear language;encourage clear communication of needs;assist patient in developing safety plan;encourage nutrition and hydration;encourage participation / independence with adls;provide emotional support;establish therapeutic relationship;assist with developing & utilizing healthy coping strategies;provide positive feedback for use of  effective coping skills;build upon strengths   Social and Therapeutic Interventions (Behavioral Disturbance) encourage socialization with peers;encourage effective boundaries with peers;encourage participation in therapeutic groups and milieu activities

## 2018-04-13 PROCEDURE — 99232 SBSQ HOSP IP/OBS MODERATE 35: CPT | Performed by: PSYCHIATRY & NEUROLOGY

## 2018-04-13 PROCEDURE — 25000132 ZZH RX MED GY IP 250 OP 250 PS 637: Performed by: PSYCHIATRY & NEUROLOGY

## 2018-04-13 PROCEDURE — 25000132 ZZH RX MED GY IP 250 OP 250 PS 637: Performed by: NURSE PRACTITIONER

## 2018-04-13 PROCEDURE — 12400007 ZZH R&B MH INTERMEDIATE UMMC

## 2018-04-13 RX ADMIN — Medication 10 MG: at 08:38

## 2018-04-13 RX ADMIN — SENNOSIDES A AND B 10 ML: 415.36 LIQUID ORAL at 20:30

## 2018-04-13 RX ADMIN — Medication 1000 MG: at 09:30

## 2018-04-13 RX ADMIN — OLANZAPINE 20 MG: 5 TABLET, ORALLY DISINTEGRATING ORAL at 20:31

## 2018-04-13 RX ADMIN — TAMSULOSIN HYDROCHLORIDE 400 MCG: 0.4 CAPSULE ORAL at 08:37

## 2018-04-13 RX ADMIN — GABAPENTIN 200 MG: 250 SUSPENSION ORAL at 20:30

## 2018-04-13 RX ADMIN — Medication 1000 MG: at 20:30

## 2018-04-13 RX ADMIN — GABAPENTIN 200 MG: 250 SUSPENSION ORAL at 08:38

## 2018-04-13 RX ADMIN — GABAPENTIN 200 MG: 250 SUSPENSION ORAL at 14:49

## 2018-04-13 RX ADMIN — VALPROIC ACID 500 MG: 250 SOLUTION ORAL at 20:30

## 2018-04-13 ASSESSMENT — ACTIVITIES OF DAILY LIVING (ADL)
DRESS: SCRUBS (BEHAVIORAL HEALTH)
GROOMING: WITH ASSISTANCE;PROMPTS
ORAL_HYGIENE: WITH ASSISTANCE
LAUNDRY: UNABLE TO COMPLETE

## 2018-04-13 NOTE — PROGRESS NOTES
Spent most of the shift in the lounge observing others or doing puzzles.  Redirected twice from going into other patients room.  Continent of urine.  Appetite good.  One word answer to questions, mostly responds to yes/no questions.  Denies feeling suicidal/dperessed or anxious, was irritable when these questions were asked.  Disheveled appearance,allowed this writer to help him change but started to yell when his face was wiped with a wet washcloth.  Medication compliant.

## 2018-04-13 NOTE — PROGRESS NOTES
04/13/18 1230   Behavioral Health   Hallucinations denies / not responding to hallucinations   Thinking distractable   Orientation person: oriented;other (see comment)  (COREY date, time, place)   Memory other (see comment)  (COREY)   Insight poor   Judgement impaired   Eye Contact into space;at floor   Affect blunted, flat   Mood mood is calm   Physical Appearance/Attire disheveled;other (see comment)  (needs prompt and assistance)   Hygiene other (see comment)  (adequate with staff assistance)   Suicidality other (see comments)  (COREY)   1. Wish to be Dead No  (COREY)   2. Non-Specific Active Suicidal Thoughts  No  (COREY)   Self Injury other (see comment)  (COREY)   Elopement (none noted this shift)   Activity other (see comment)  (present in milieu- content with puzzles)   Speech other (see comments)  (uses head nods, but only when pt wants to respond)   Medication Sensitivity no stated side effects;no observed side effects   Psychomotor / Gait balanced;steady   Safety   Suicidality Status 15   Coping/Psychosocial   Verbalized Emotional State other (see comments)  (COREY)   Plan Of Care Reviewed With patient   Patient Agreement with Plan of Care unable to participate   Psycho Education   Type of Intervention 1:1 intervention   Response participates with cues/redirection   Hours 0.5   Treatment Detail (check in-observation)   Group Therapy Session   Group Attendance refused to attend group session   Activities of Daily Living   Hygiene/Grooming with assistance;prompts   Oral Hygiene with assistance   Dress scrubs (behavioral health)   Laundry unable to complete   Room Organization unable   Behavioral Health Interventions   Behavioral Disturbance maintain safety precautions;maintain safe secure environment;reality orientation;decrease environmental stimulation;simple, clear language;assist in development of alternative methods of expressive communication;encourage clear communication of needs;redirection of aggressive  behaviors;redirection of intrusive behaviors;encourage participation / independence with adls;establish therapeutic relationship;provide emotional support;assist with developing & utilizing healthy coping strategies;provide positive feedback for use of effective coping skills;build upon strengths;monitor need for prn medication;monitor confusion, memory loss, decision making ability and reorient / intervent as needed   Social and Therapeutic Interventions (Behavioral Disturbance) encourage socialization with peers;encourage effective boundaries with peers;encourage participation in therapeutic groups and milieu activities     Pt was in milieu most of the shift playing with puzzles and looking outside at the weather changing. Pt had urine incontinence x1 at 1145 and was compliant with staff helping him get clean and change into new scrubs/socks/brief. Pt affect appears brighter with calm mood this shift. Pt utilizing puzzles as a distraction. Pt ate well at both meals. Staff able to communicate with patient through head nods as he pointed to tea, nodded, and received it from staff. Unable to assess SI/SIB.

## 2018-04-13 NOTE — PROGRESS NOTES
[facility], [place]   Psychiatric Progress Note        Interim History:   The patient's care was discussed with the treatment team during the daily team meeting and/or staff's chart notes were reviewed.  Staff report patient has been more alert. Still going in and out of other patient rooms but his room was moved.     The patient denies problems with mood, sleep, appetite, or pain. Denies SI. Answers questions with nodding or shaking his head. Does brighten on approach.     Spoke to [id number] about options. He recommends looking into returning patient to previous facility and contacting ombudsperson if facility unwilling.          Medications:       tamsulosin  400 mcg Oral Daily     sulfaSALAzine  1,000 mg Oral BID     OLANZapine  20 mg Oral At Bedtime     sennosides  10 mL Oral BID     gabapentin  200 mg Oral TID     valproic acid  500 mg Oral At Bedtime     furosemide  10 mg Oral Q Mon Wed Fri AM     multivitamin, therapeutic with minerals  1 tablet Oral Once          Allergies:   No Known Allergies       Labs:     No results found for this or any previous visit (from the past 24 hour(s)).       Psychiatric Examination:     BP 98/71  Pulse 87  Temp 97.7  F (36.5  C) (Tympanic)  Resp 16  Wt 64.2 kg (141 lb 8 oz)  SpO2 98%  Weight is 141 lbs 8 oz  There is no height or weight on file to calculate BMI.  Orthostatic Vitals       Most Recent      Sitting Orthostatic /65 04/02 0807    Sitting Orthostatic Pulse (bpm) 82 04/02 0807    Standing Orthostatic /72 04/02 0807    Standing Orthostatic Pulse (bpm) 86 04/02 0807            Appearance: awake  Attitude:  more cooperative  Eye Contact:  poor   Mood:  denies depression  Affect:  intensity is flat  Speech:  paucity of speech, does utter some phrases  Psychomotor Behavior:  no evidence of tardive dyskinesia, dystonia, or tics  Thought Process:  concrete  Associations:  no loose associations  Thought Content:  no evidence  of suicidal ideation or homicidal ideation and no evidence of psychotic thought  Insight:  partial  Judgement:  limited  Oriented to:  person  Attention Span and Concentration:  poor  Recent and Remote Memory:  limited    Clinical Global Impressions  First:  Considering your total clinical experience with this particular patient population, how severe are the patient's symptoms at this time?: 7 (04/01/18 1140)  Compared to the patient's condition at the START of treatment, this patient's condition is:: 4 (04/01/18 1140)  Most recent:  Considering your total clinical experience with this particular patient population, how severe are the patient's symptoms at this time?: 7 (04/01/18 1140)  Compared to the patient's condition at the START of treatment, this patient's condition is:: 4 (04/01/18 1140)    # Pain Assessment:  Current Pain Score 4/10/2018   Patient currently in pain? yes   Pain score (0-10) -   Pain location -   Pain descriptors Headache   - Ronald currently denies any pain.            Precautions:     Behavioral Orders   Procedures     Code 1 - Restrict to Unit     Routine Programming     As clinically indicated     Status 15     Every 15 minutes.          DIagnoses:   Bipolar disorder Type 1 versus 2  Developmental delay         Plan:     1) Continue Zyprexa. Changed to liquid formulation.   2) VPA 500mg liquid Qday started on admission.   3) Gabapentin 200mg liquid TID started on admission.   4) Patient admitted on a 72-hour hold. Filed petition due to current lack of capacity and guardian. Supported by Replaced by [facility]. First hearing was on Monday.   5) Patient doing well off SIO.   6) Disposition to be determined. Family is considering taking him home. Could look into returning to previous facility Patient to show improvement in terms of psychosis and aggression prior to discharge.

## 2018-04-14 PROCEDURE — 25000132 ZZH RX MED GY IP 250 OP 250 PS 637: Performed by: PSYCHIATRY & NEUROLOGY

## 2018-04-14 PROCEDURE — 12400007 ZZH R&B MH INTERMEDIATE UMMC

## 2018-04-14 PROCEDURE — 25000132 ZZH RX MED GY IP 250 OP 250 PS 637: Performed by: NURSE PRACTITIONER

## 2018-04-14 RX ADMIN — GABAPENTIN 200 MG: 250 SUSPENSION ORAL at 20:56

## 2018-04-14 RX ADMIN — GABAPENTIN 200 MG: 250 SUSPENSION ORAL at 14:29

## 2018-04-14 RX ADMIN — TAMSULOSIN HYDROCHLORIDE 400 MCG: 0.4 CAPSULE ORAL at 08:36

## 2018-04-14 RX ADMIN — SENNOSIDES A AND B 10 ML: 415.36 LIQUID ORAL at 08:35

## 2018-04-14 RX ADMIN — Medication 1000 MG: at 20:56

## 2018-04-14 RX ADMIN — OLANZAPINE 20 MG: 5 TABLET, ORALLY DISINTEGRATING ORAL at 20:57

## 2018-04-14 RX ADMIN — Medication 1000 MG: at 08:35

## 2018-04-14 RX ADMIN — VALPROIC ACID 500 MG: 250 SOLUTION ORAL at 20:57

## 2018-04-14 RX ADMIN — SENNOSIDES A AND B 10 ML: 415.36 LIQUID ORAL at 20:56

## 2018-04-14 RX ADMIN — GABAPENTIN 200 MG: 250 SUSPENSION ORAL at 08:36

## 2018-04-14 ASSESSMENT — ACTIVITIES OF DAILY LIVING (ADL)
GROOMING: PROMPTS
LAUNDRY: UNABLE TO COMPLETE
GROOMING: PROMPTS
DRESS: SCRUBS (BEHAVIORAL HEALTH);PROMPTS
ORAL_HYGIENE: PROMPTS
DRESS: SCRUBS (BEHAVIORAL HEALTH);INDEPENDENT
ORAL_HYGIENE: PROMPTS

## 2018-04-14 NOTE — PROGRESS NOTES
04/14/18 1258   Behavioral Health   Hallucinations denies / not responding to hallucinations   Thinking distractable;poor concentration   Orientation person: oriented;place: oriented;date: oriented;time: oriented   Memory other (see comment)  (maggie)   Insight poor   Judgement impaired   Eye Contact into space   Affect full range affect   Mood mood is calm   Physical Appearance/Attire disheveled   Hygiene neglected grooming - unclean body, hair, teeth   Coping/Psychosocial   Verbalized Emotional State other (see comments)  (did not verbalize)   Plan Of Care Reviewed With patient   Psycho Education   Type of Intervention structured groups   Response refuses   Activities of Daily Living   Hygiene/Grooming prompts   Oral Hygiene prompts   Dress scrubs (behavioral health);prompts   Laundry unable to complete   Room Organization unable   Activity   Activity Assistance Provided independent   Behavioral Health Interventions   Behavioral Disturbance maintain safe secure environment;maintain safety precautions   Social and Therapeutic Interventions (Behavioral Disturbance) encourage socialization with peers;encourage participation in therapeutic groups and milieu activities     Pt spent most of shift on milieu, sleeping. Pt had minimal interaction with staff and peers. When prompted to go to the bathroom pt did go. Pt is attended and eating meals. No concerns or issues to report otherwise.

## 2018-04-14 NOTE — PLAN OF CARE
Problem: Behavioral Disturbance  Goal: Behavioral Disturbance  Pt able to maintain a reality oriented conversation.    Pt responds appropriately to redirection for aggressive, intrusive behavior.    Pt attends groups without difficulty, responding appropriately to the activities of the group.    Pt completes ADL's each shift.    Pt's intake of fluids and nutrition is adequate.  Signs and symptoms of listed problems will be absent or manageable by discharge or transition of care.     Pt was visible in the lounge for the majority of the shift.  Pt's affect remains flat/blunted.  Pt does not interact with peers but just sat in the lounge observing others.  Pt responds to simple questions by just nodding his head.  Pt ate dinner tonight.  Pt was continent of urine and was helped by staff to change.  Pt has been calm and co-operative with cares this shift.  Will continue to assess pt.

## 2018-04-15 PROCEDURE — 25000132 ZZH RX MED GY IP 250 OP 250 PS 637: Performed by: NURSE PRACTITIONER

## 2018-04-15 PROCEDURE — 25000132 ZZH RX MED GY IP 250 OP 250 PS 637: Performed by: PSYCHIATRY & NEUROLOGY

## 2018-04-15 PROCEDURE — 12400007 ZZH R&B MH INTERMEDIATE UMMC

## 2018-04-15 RX ADMIN — SENNOSIDES A AND B 10 ML: 415.36 LIQUID ORAL at 11:35

## 2018-04-15 RX ADMIN — Medication 1000 MG: at 21:12

## 2018-04-15 RX ADMIN — GABAPENTIN 200 MG: 250 SUSPENSION ORAL at 21:11

## 2018-04-15 RX ADMIN — VALPROIC ACID 500 MG: 250 SOLUTION ORAL at 21:12

## 2018-04-15 RX ADMIN — Medication 1000 MG: at 11:36

## 2018-04-15 RX ADMIN — OLANZAPINE 20 MG: 5 TABLET, ORALLY DISINTEGRATING ORAL at 21:12

## 2018-04-15 RX ADMIN — TAMSULOSIN HYDROCHLORIDE 400 MCG: 0.4 CAPSULE ORAL at 08:53

## 2018-04-15 RX ADMIN — GABAPENTIN 200 MG: 250 SUSPENSION ORAL at 14:01

## 2018-04-15 RX ADMIN — SENNOSIDES A AND B 10 ML: 415.36 LIQUID ORAL at 21:11

## 2018-04-15 RX ADMIN — GABAPENTIN 200 MG: 250 SUSPENSION ORAL at 08:52

## 2018-04-15 ASSESSMENT — ACTIVITIES OF DAILY LIVING (ADL)
LAUNDRY: UNABLE TO COMPLETE
ORAL_HYGIENE: PROMPTS
GROOMING: PROMPTS
DRESS: SCRUBS (BEHAVIORAL HEALTH)

## 2018-04-15 NOTE — PROGRESS NOTES
04/14/18 2128   Behavioral Health   Hallucinations appears responding;denies / not responding to hallucinations   Thinking distractable;poor concentration   Orientation other (see comment)   Memory other (see comment)  (COREY)   Insight poor   Judgement impaired   Eye Contact at examiner   Affect blunted, flat   Mood mood is calm   Physical Appearance/Attire disheveled   Hygiene neglected grooming - unclean body, hair, teeth   Suicidality other (see comments)  (denied SI)   1. Wish to be Dead No   2. Non-Specific Active Suicidal Thoughts  No   Self Injury other (see comment)  (denied SIB urges)   Elopement (no indicators)   Activity withdrawn;restless   Speech clear;coherent   Psychomotor / Gait balanced;steady   Sleep/Rest/Relaxation   Day/Evening Time Hours resting in bed   Safety   Suicidality Status 15   Coping/Psychosocial   Verbalized Emotional State anxiety;depression   Psycho Education   Type of Intervention 1:1 intervention   Hours 0.5   Treatment Detail current MH status   Activities of Daily Living   Hygiene/Grooming prompts   Oral Hygiene prompts   Dress scrubs (behavioral health);independent   Room Organization independent   Groups   Details none   Behavioral Health Interventions   Behavioral Disturbance maintain safety precautions;monitor need to revise level of observation;establish therapeutic relationship;assist with developing & utilizing healthy coping strategies;provide positive feedback for use of effective coping skills;build upon strengths;monitor confusion, memory loss, decision making ability and reorient / intervent as needed   Social and Therapeutic Interventions (Behavioral Disturbance) encourage socialization with peers;encourage participation in therapeutic groups and milieu activities     Patient visible intermittently in the milieu during this shift.  Minimally engaged, both socially and programmatically.  Minimally responsive to writer in 1:1 time; endorsed some anxiety and  depression but denied SI/SIB urges, voices, etc.  Seemed quite guardd and internally focused on approach.  Presented no behavioral management issues.   Jose Rafael Quarles   04/14/2018

## 2018-04-15 NOTE — PLAN OF CARE
"Problem: Behavioral Disturbance  Goal: Behavioral Disturbance  Pt able to maintain a reality oriented conversation.    Pt responds appropriately to redirection for aggressive, intrusive behavior.    Pt attends groups without difficulty, responding appropriately to the activities of the group.    Pt completes ADL's each shift.    Pt's intake of fluids and nutrition is adequate.  Signs and symptoms of listed problems will be absent or manageable by discharge or transition of care.   Outcome: No Change  Nursing Assessment:  Ronald was up ad jaclyn, alternated in and out of his room throughout the shift. Pt did not attend groups this shift. Ronald initially refused his Senna and Sulfa drug though did take them after lunch. Ronald was drowsy after breakfast, slept in the lounge for roughly 1 hour. Pt was incontinent of urine x1, was also assisted to the bathroom x 1 and used another Pts bathroom 1 this shift. Ronald was minimally verbal, did approach writer when done with lunch, smiled and said \"Finished\". Pt's affect was flat mostly though was observed smiling at times and appeared to be responding to internal stimuli.       "

## 2018-04-16 PROCEDURE — 12400007 ZZH R&B MH INTERMEDIATE UMMC

## 2018-04-16 PROCEDURE — 99231 SBSQ HOSP IP/OBS SF/LOW 25: CPT | Performed by: PSYCHIATRY & NEUROLOGY

## 2018-04-16 PROCEDURE — 25000132 ZZH RX MED GY IP 250 OP 250 PS 637: Performed by: PSYCHIATRY & NEUROLOGY

## 2018-04-16 RX ADMIN — SENNOSIDES A AND B 10 ML: 415.36 LIQUID ORAL at 19:59

## 2018-04-16 RX ADMIN — SENNOSIDES A AND B 10 ML: 415.36 LIQUID ORAL at 08:45

## 2018-04-16 RX ADMIN — GABAPENTIN 200 MG: 250 SUSPENSION ORAL at 14:17

## 2018-04-16 RX ADMIN — Medication 10 MG: at 08:44

## 2018-04-16 RX ADMIN — OLANZAPINE 20 MG: 5 TABLET, ORALLY DISINTEGRATING ORAL at 19:58

## 2018-04-16 RX ADMIN — Medication 1000 MG: at 19:58

## 2018-04-16 RX ADMIN — VALPROIC ACID 500 MG: 250 SOLUTION ORAL at 19:59

## 2018-04-16 RX ADMIN — GABAPENTIN 200 MG: 250 SUSPENSION ORAL at 20:01

## 2018-04-16 RX ADMIN — Medication 1000 MG: at 08:45

## 2018-04-16 RX ADMIN — GABAPENTIN 200 MG: 250 SUSPENSION ORAL at 08:45

## 2018-04-16 ASSESSMENT — ACTIVITIES OF DAILY LIVING (ADL)
ORAL_HYGIENE: PROMPTS
GROOMING: PROMPTS
LAUNDRY: UNABLE TO COMPLETE
ORAL_HYGIENE: PROMPTS
LAUNDRY: UNABLE TO COMPLETE
DRESS: SCRUBS (BEHAVIORAL HEALTH)
GROOMING: PROMPTS
DRESS: SCRUBS (BEHAVIORAL HEALTH)

## 2018-04-16 NOTE — PROGRESS NOTES
"[facility], [place]   Psychiatric Progress Note        Interim History:   The patient's care was discussed with the treatment team during the daily team meeting and/or staff's chart notes were reviewed.  Staff report patient has been eating well. Did sleep only 2.5 hours.     The patient denies problems with mood, sleep, appetite, or pain. Denies SI. Answers questions with nodding or shaking his head. Does brighten on approach. Ended interview by saying, \"I'm going to my room.\"          Medications:       tamsulosin  400 mcg Oral Daily     sulfaSALAzine  1,000 mg Oral BID     OLANZapine  20 mg Oral At Bedtime     sennosides  10 mL Oral BID     gabapentin  200 mg Oral TID     valproic acid  500 mg Oral At Bedtime     furosemide  10 mg Oral Q Mon Wed Fri AM     multivitamin, therapeutic with minerals  1 tablet Oral Once          Allergies:   No Known Allergies       Labs:     No results found for this or any previous visit (from the past 24 hour(s)).       Psychiatric Examination:     /72  Pulse 94  Temp 96.9  F (36.1  C) (Oral)  Resp 16  Wt 64.4 kg (142 lb)  SpO2 97%  Weight is 142 lbs 0 oz  There is no height or weight on file to calculate BMI.  Orthostatic Vitals       Most Recent      Sitting Orthostatic /65 04/02 0807    Sitting Orthostatic Pulse (bpm) 82 04/02 0807    Standing Orthostatic /72 04/02 0807    Standing Orthostatic Pulse (bpm) 86 04/02 0807            Appearance: awake  Attitude:  more cooperative  Eye Contact:  poor   Mood:  denies depression  Affect:  intensity is flat  Speech:  paucity of speech, does utter some phrases  Psychomotor Behavior:  no evidence of tardive dyskinesia, dystonia, or tics  Thought Process:  concrete  Associations:  no loose associations  Thought Content:  no evidence of suicidal ideation or homicidal ideation and no evidence of psychotic thought  Insight:  partial  Judgement:  limited  Oriented to:  person  Attention Span " and Concentration:  poor  Recent and Remote Memory:  limited    Clinical Global Impressions  First:  Considering your total clinical experience with this particular patient population, how severe are the patient's symptoms at this time?: 7 (04/01/18 1140)  Compared to the patient's condition at the START of treatment, this patient's condition is:: 4 (04/01/18 1140)  Most recent:  Considering your total clinical experience with this particular patient population, how severe are the patient's symptoms at this time?: 7 (04/01/18 1140)  Compared to the patient's condition at the START of treatment, this patient's condition is:: 4 (04/01/18 1140)    # Pain Assessment:  Current Pain Score 4/16/2018   Patient currently in pain? denies   Pain score (0-10) -   Pain location -   Pain descriptors -   - Ronald currently denies any pain.            Precautions:     Behavioral Orders   Procedures     Code 1 - Restrict to Unit     Routine Programming     As clinically indicated     Status 15     Every 15 minutes.          DIagnoses:   Bipolar disorder Type 1 versus 2  Developmental delay         Plan:     1) Continue Zyprexa. Changed to liquid formulation.   2) VPA 500mg liquid Qday started on admission.   3) Gabapentin 200mg liquid TID started on admission.   4) Patient admitted on a 72-hour hold. Filed petition due to current lack of capacity and guardian. Supported by [facility]. Likely committed.   5) Patient doing well off SIO.   6) Disposition to be determined. Family is considering taking him home. Could look into returning to previous facility.Patient to show improvement in terms of psychosis and aggression prior to discharge.

## 2018-04-16 NOTE — PROGRESS NOTES
04/16/18 1319   Behavioral Health   Hallucinations denies / not responding to hallucinations   Thinking confused;distractable   Insight poor   Judgement impaired   Eye Contact at examiner   Affect blunted, flat   Mood mood is calm   Physical Appearance/Attire disheveled;untidy   Hygiene neglected grooming - unclean body, hair, teeth   Suicidality other (see comments)  (None stated or observed)   1. Wish to be Dead No   2. Non-Specific Active Suicidal Thoughts  No   Change in Protective Factors? No   Enviromental Risk Factors None   Self Injury other (see comment)  (None stated or observed)   Activity withdrawn   Speech coherent  (Minimally verbal)   Medication Sensitivity no stated side effects;no observed side effects   Psychomotor / Gait balanced;steady   Safety   Suicidality Status 15   Activities of Daily Living   Hygiene/Grooming prompts   Oral Hygiene prompts   Dress scrubs (behavioral health)   Laundry unable to complete   Room Organization prompts   Behavioral Health Interventions   Behavioral Disturbance maintain safety precautions;monitor need to revise level of observation;maintain safe secure environment;reality orientation;simple, clear language;decrease environmental stimulation;assist in development of alternative methods of expressive communication;encourage clear communication of needs;redirection of intrusive behaviors;redirection of aggressive behaviors;assist patient in developing safety plan;encourage nutrition and hydration;encourage participation / independence with adls;provide emotional support;establish therapeutic relationship;assist with developing & utilizing healthy coping strategies;provide positive feedback for use of effective coping skills;build upon strengths   Social and Therapeutic Interventions (Behavioral Disturbance) encourage socialization with peers;encourage effective boundaries with peers;encourage participation in therapeutic groups and milieu activities     Pt. Was  withdrawn from the milieu though present in it in the tv lounge area. He did show moments of frustration when he was bothered while eating, but other than that he had an overall good shift. He didn't state any SI or show any SIB throughout the shift.

## 2018-04-17 PROCEDURE — 99231 SBSQ HOSP IP/OBS SF/LOW 25: CPT | Performed by: PSYCHIATRY & NEUROLOGY

## 2018-04-17 PROCEDURE — 99221 1ST HOSP IP/OBS SF/LOW 40: CPT | Performed by: PHYSICIAN ASSISTANT

## 2018-04-17 PROCEDURE — 12400007 ZZH R&B MH INTERMEDIATE UMMC

## 2018-04-17 PROCEDURE — 25000132 ZZH RX MED GY IP 250 OP 250 PS 637: Performed by: PSYCHIATRY & NEUROLOGY

## 2018-04-17 PROCEDURE — 25000132 ZZH RX MED GY IP 250 OP 250 PS 637: Performed by: NURSE PRACTITIONER

## 2018-04-17 PROCEDURE — 99207 ZZC DOWN CODE DUE TO INITIAL EXAM: CPT | Performed by: PHYSICIAN ASSISTANT

## 2018-04-17 RX ORDER — GABAPENTIN 250 MG/5ML
200 SOLUTION ORAL 3 TIMES DAILY
Qty: 470 ML | Refills: 3 | Status: SHIPPED | OUTPATIENT
Start: 2018-04-17

## 2018-04-17 RX ADMIN — GABAPENTIN 200 MG: 250 SUSPENSION ORAL at 13:39

## 2018-04-17 RX ADMIN — VALPROIC ACID 500 MG: 250 SOLUTION ORAL at 19:53

## 2018-04-17 RX ADMIN — TAMSULOSIN HYDROCHLORIDE 400 MCG: 0.4 CAPSULE ORAL at 08:37

## 2018-04-17 RX ADMIN — Medication 1000 MG: at 08:36

## 2018-04-17 RX ADMIN — Medication 1000 MG: at 19:53

## 2018-04-17 RX ADMIN — GABAPENTIN 200 MG: 250 SUSPENSION ORAL at 08:37

## 2018-04-17 RX ADMIN — OLANZAPINE 20 MG: 5 TABLET, ORALLY DISINTEGRATING ORAL at 19:53

## 2018-04-17 RX ADMIN — GABAPENTIN 200 MG: 250 SUSPENSION ORAL at 19:52

## 2018-04-17 RX ADMIN — SENNOSIDES A AND B 10 ML: 415.36 LIQUID ORAL at 08:37

## 2018-04-17 ASSESSMENT — ACTIVITIES OF DAILY LIVING (ADL)
DRESS: SCRUBS (BEHAVIORAL HEALTH)
GROOMING: WITH ASSISTANCE
DRESS: SCRUBS (BEHAVIORAL HEALTH)
LAUNDRY: UNABLE TO COMPLETE
ORAL_HYGIENE: PROMPTS
GROOMING: PROMPTS

## 2018-04-17 NOTE — PROGRESS NOTES
[facility], [place]   Psychiatric Progress Note        Interim History:   The patient's care was discussed with the treatment team during the daily team meeting and/or staff's chart notes were reviewed.  Staff report patient had a good evening. No wandering. Has overall been more engaged.     The patient denies problems with mood, sleep, appetite, or pain. Denies SI. Answers questions with nodding or shaking his head. Says that he plans to eat breakfast now.          Medications:       tamsulosin  400 mcg Oral Daily     sulfaSALAzine  1,000 mg Oral BID     OLANZapine  20 mg Oral At Bedtime     sennosides  10 mL Oral BID     gabapentin  200 mg Oral TID     valproic acid  500 mg Oral At Bedtime     furosemide  10 mg Oral Q Mon Wed Fri AM     multivitamin, therapeutic with minerals  1 tablet Oral Once          Allergies:   No Known Allergies       Labs:     No results found for this or any previous visit (from the past 24 hour(s)).       Psychiatric Examination:     /72  Pulse 94  Temp 97.6  F (36.4  C) (Tympanic)  Resp 16  Wt 65.5 kg (144 lb 4.8 oz)  SpO2 95%  Weight is 144 lbs 4.8 oz  There is no height or weight on file to calculate BMI.  Orthostatic Vitals       Most Recent      Sitting Orthostatic /65 04/02 0807    Sitting Orthostatic Pulse (bpm) 82 04/02 0807    Standing Orthostatic /72 04/02 0807    Standing Orthostatic Pulse (bpm) 86 04/02 0807            Appearance: awake  Attitude:  more cooperative  Eye Contact:  poor   Mood:  denies depression  Affect:  intensity is flat  Speech:  paucity of speech, does utter some phrases  Psychomotor Behavior:  no evidence of tardive dyskinesia, dystonia, or tics  Thought Process:  concrete  Associations:  no loose associations  Thought Content:  no evidence of suicidal ideation or homicidal ideation and no evidence of psychotic thought  Insight:  partial  Judgement:  limited  Oriented to:  person  Attention Span and  Concentration:  poor  Recent and Remote Memory:  limited    Clinical Global Impressions  First:  Considering your total clinical experience with this particular patient population, how severe are the patient's symptoms at this time?: 7 (04/01/18 1140)  Compared to the patient's condition at the START of treatment, this patient's condition is:: 4 (04/01/18 1140)  Most recent:  Considering your total clinical experience with this particular patient population, how severe are the patient's symptoms at this time?: 7 (04/01/18 1140)  Compared to the patient's condition at the START of treatment, this patient's condition is:: 4 (04/01/18 1140)    # Pain Assessment:  Current Pain Score 4/17/2018   Patient currently in pain? denies   Pain score (0-10) -   Pain location -   Pain descriptors -   - Ronald currently denies any pain.            Precautions:     Behavioral Orders   Procedures     Code 1 - Restrict to Unit     Routine Programming     As clinically indicated     Status 15     Every 15 minutes.          DIagnoses:   Bipolar disorder Type 1 versus 2  Developmental delay         Plan:     1) Continue Zyprexa. Changed to liquid formulation.   2) VPA 500mg liquid Qday started on admission.   3) Gabapentin 200mg liquid TID started on admission.   4) Patient admitted on a 72-hour hold. Filed petition due to current lack of capacity and guardian. Supported by [facility]. Likely committed.   5) Patient doing well off SIO.   6) Disposition to be determined. Family is considering taking him home. Could look into returning to previous facility. Patient to show improvement in terms of psychosis and aggression prior to discharge.

## 2018-04-17 NOTE — PROGRESS NOTES
COREY SI and nothing to report on SIB with no statements or actions.  Frequent encouragement to use restroom. Patient blurts out simple statements indicating if he is willing to do something or if he wants something.  Very redirectable.  Spent much of his time in lounge on the fringes with peers.

## 2018-04-17 NOTE — PROGRESS NOTES
Left voice message for pt's  at [facility] ([phone]) to inform her of pt's discharge tomorrow.

## 2018-04-17 NOTE — PROGRESS NOTES
Spoke with admission coordinator at [phone], to determine if they would consider pt readmitting to their facility. She will talk with team members and get back to writer.  Update:     Xiomara, from [facility], contacted writer to inform that they will not re-admit pt as there were many safety concerns while he was at their facility.    Left voice message for pt's niece, Brittany Dalal, to discuss discharge plan.    Update:  Spoke with [facility] who is available to  pt for discharge tomorrow. Will schedule time later.    Left voice message for Maryann Child at [facility] to fax final commitment papers.
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
## 2018-04-18 VITALS
SYSTOLIC BLOOD PRESSURE: 120 MMHG | HEART RATE: 100 BPM | TEMPERATURE: 96.6 F | WEIGHT: 144.3 LBS | OXYGEN SATURATION: 98 % | RESPIRATION RATE: 15 BRPM | DIASTOLIC BLOOD PRESSURE: 82 MMHG

## 2018-04-18 PROCEDURE — 99238 HOSP IP/OBS DSCHRG MGMT 30/<: CPT | Performed by: PSYCHIATRY & NEUROLOGY

## 2018-04-18 PROCEDURE — 25000132 ZZH RX MED GY IP 250 OP 250 PS 637: Performed by: PSYCHIATRY & NEUROLOGY

## 2018-04-18 PROCEDURE — 90853 GROUP PSYCHOTHERAPY: CPT

## 2018-04-18 PROCEDURE — 25000132 ZZH RX MED GY IP 250 OP 250 PS 637: Performed by: NURSE PRACTITIONER

## 2018-04-18 RX ORDER — FUROSEMIDE 20 MG
10 TABLET ORAL
Qty: 15 TABLET | Refills: 0 | Status: SHIPPED | OUTPATIENT
Start: 2018-04-20

## 2018-04-18 RX ADMIN — GABAPENTIN 200 MG: 250 SUSPENSION ORAL at 13:47

## 2018-04-18 RX ADMIN — Medication 10 MG: at 09:18

## 2018-04-18 RX ADMIN — Medication 1000 MG: at 09:19

## 2018-04-18 RX ADMIN — TAMSULOSIN HYDROCHLORIDE 400 MCG: 0.4 CAPSULE ORAL at 09:19

## 2018-04-18 RX ADMIN — GABAPENTIN 200 MG: 250 SUSPENSION ORAL at 09:19

## 2018-04-18 ASSESSMENT — ACTIVITIES OF DAILY LIVING (ADL): GROOMING: WITH ASSISTANCE
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
## 2018-04-18 NOTE — PROGRESS NOTES
Spoke with pt's niece, Brittany, who will b e here to transport pt to her home at 1pm. Left voice message for Shari Mckee ([facility] Coordinator to inform that pt is discharging today.

## 2018-04-18 NOTE — DISCHARGE INSTRUCTIONS
Behavioral Discharge Planning and Instructions      Summary:  You were admitted on 3/30/2018  due to aggression.  You were treated by Dr. Mt Nayak MD and discharged on 04/18/2018 from [address] to niece's house.    [facility]  [address]  [address]  [place]  [phone]      Principal Diagnosis:   Bipolar disorder Type 1 versus 2  Developmental delay    Health Care Follow-up Appointments:   PCP:  Dr Linda Izquierdo, April 24th at 1:20 pm  [address],   [place] [address]   Phone: [phone]    :  [facility]  Mental Health Resources  [phone]  [facility] Coordinator:  Shari Mckee  [phone]  Attend all scheduled appointments with your outpatient providers. Call at least 24 hours in advance if you need to reschedule an appointment to ensure continued access to your outpatient providers.   Major Treatments, Procedures and Findings:  You were provided with: a psychiatric assessment, assessed for medical stability and medication evaluation and/or management    Symptoms to Report: feeling more aggressive, increased confusion, losing more sleep, mood getting worse or thoughts of suicide    Early warning signs can include: increased depression or anxiety sleep disturbances increased thoughts or behaviors of suicide or self-harm  increased unusual thinking, such as paranoia or hearing voices    Safety and Wellness:  Take all medicines as directed.  Make no changes unless your doctor suggests them.      Follow treatment recommendations.  Refrain from alcohol and non-prescribed drugs.  If there is a concern for safety, call 911.    Resources:   Crisis Intervention: 572.333.8674 or 810-571-8484 (TTY: 417.524.4206).  Call anytime for help.  National [place] on Mental Illness (www.[place].radha.org): 435.785.5337 or 623-965-1818.  Suicide Awareness Voices of Education (SAVE) (www.save.org): 113-709-SLJO (7791)  National Suicide Prevention Line (www.mentalhealthmn.org): 505-445-ZVSC  (7625)  Mental Health Consumer/Survivor Network of [place] (www.mhcsn.net): 568-049-3741 or 916-137-0238  Mental Health Association of [place] (www.mentalhealth.org): 104.846.4932 or 470-983-0133  Self- Management and Recovery Training., SMART-- Toll free: 141.541.3333  www.AUPEO!.org  [facility] Crisis (COPE) Response - Adult [phone]    The treatment team has appreciated the opportunity to work with you.     If you have any questions or concerns our unit number is [phone].

## 2018-04-18 NOTE — PROGRESS NOTES
Discharge pharmacy was contacted and request was made to deliver discharge medications by 1200.    Pt's AVS was discharged home with sister Candice. AVS was reviewed with sister and she is aware of follow up appointments and the need for 24 hour supervision. Medications and medication schedule was reviewed with sister/Candice. All belongings were sent with patient including discharge medications.

## 2018-04-18 NOTE — PROGRESS NOTES
Spoke with pt's , Adina at [phone], to inform that pt will be discharging today at 1pm.   Adina will contact pt's niece, Brittany, to set up a time to meet with pt. Faxed Provisional Discharge to Adina at [phone].

## 2018-04-18 NOTE — PROGRESS NOTES
Brief Medicine Note    Medicine is following this patient's tachycardia. Of note, patient's HR was 120--> 108--> 100 yesterday (without intervention), and 98 today. EKG however shows a HR of 87. QTc is 411. EKG appears normal per my read.    Today's vital signs, medications, and nursing notes were reviewed.      /82  Pulse 100  Temp 96.6  F (35.9  C) (Tympanic)  Resp 15  Wt 65.5 kg (144 lb 4.8 oz)  SpO2 98%    A/P:  Please have patient establish care with a primary care provider as soon as possible. Recommend: CBC, BMP and EKG if HR continues to be elevated as outpatient. Patient ok for discharge as this is sinus tachycardia posisbly related to poor PO intake and medication related (zyprexa specifically). Case discussed with Dr. Tee.    Currently, this patient is medically stable and internal medicine will sign off. Please page the Internal Medicine job code pager for any intercurrent medical issues which arise. Thank you for the opportunity to be a part of this patient's care.    Riley Fabian PA-C  Hospital Medicine  Pager: [phone]

## 2018-04-18 NOTE — PLAN OF CARE
Problem: Behavioral Disturbance  Goal: Behavioral Disturbance  Pt able to maintain a reality oriented conversation.    Pt responds appropriately to redirection for aggressive, intrusive behavior.    Pt attends groups without difficulty, responding appropriately to the activities of the group.    Pt completes ADL's each shift.    Pt's intake of fluids and nutrition is adequate.  Signs and symptoms of listed problems will be absent or manageable by discharge or transition of care.   Outcome: Therapy, progress toward functional goals is gradual  No aggression. Resistive to taking a shower. Cooperative with pericare. Incontinent large amount stool  And later this evening had large continent bm. Smiles quite a bit. Likes to put wooden puzzles together. Appetite good.  Consistently Pulse over 100 while in hospital.   EKG was attempted on 2 occasions this evening but he would not agree to stay on his bed for the procedure. Plan: try again in am, notify IM with update.  Today's Pulses: 105, 115, 120, 109, 100.

## 2018-04-19 LAB — INTERPRETATION ECG - MUSE: NORMAL

## 2021-02-16 ENCOUNTER — RECORDS - HEALTHEAST (OUTPATIENT)
Dept: LAB | Facility: CLINIC | Age: 51
End: 2021-02-16

## 2021-02-17 LAB
ANION GAP SERPL CALCULATED.3IONS-SCNC: 9 MMOL/L (ref 5–18)
BUN SERPL-MCNC: 37 MG/DL (ref 8–22)
CALCIUM SERPL-MCNC: 8.9 MG/DL (ref 8.5–10.5)
CHLORIDE BLD-SCNC: 102 MMOL/L (ref 98–107)
CO2 SERPL-SCNC: 26 MMOL/L (ref 22–31)
CREAT SERPL-MCNC: 0.41 MG/DL (ref 0.7–1.3)
ERYTHROCYTE [DISTWIDTH] IN BLOOD BY AUTOMATED COUNT: 15.4 % (ref 11–14.5)
GFR SERPL CREATININE-BSD FRML MDRD: >60 ML/MIN/1.73M2
GLUCOSE BLD-MCNC: 70 MG/DL (ref 70–125)
HCT VFR BLD AUTO: 33.7 % (ref 40–54)
HGB BLD-MCNC: 11.4 G/DL (ref 14–18)
MCH RBC QN AUTO: 29.1 PG (ref 27–34)
MCHC RBC AUTO-ENTMCNC: 33.8 G/DL (ref 32–36)
MCV RBC AUTO: 86 FL (ref 80–100)
PLATELET # BLD AUTO: 335 THOU/UL (ref 140–440)
PMV BLD AUTO: 11.1 FL (ref 8.5–12.5)
POTASSIUM BLD-SCNC: 4.4 MMOL/L (ref 3.5–5)
RBC # BLD AUTO: 3.92 MILL/UL (ref 4.4–6.2)
SODIUM SERPL-SCNC: 137 MMOL/L (ref 136–145)
WBC: 18.1 THOU/UL (ref 4–11)

## 2021-05-06 ENCOUNTER — RECORDS - HEALTHEAST (OUTPATIENT)
Dept: LAB | Facility: CLINIC | Age: 51
End: 2021-05-06

## 2021-05-07 LAB
ANION GAP SERPL CALCULATED.3IONS-SCNC: 9 MMOL/L (ref 5–18)
BUN SERPL-MCNC: 17 MG/DL (ref 8–22)
CALCIUM SERPL-MCNC: 9 MG/DL (ref 8.5–10.5)
CHLORIDE BLD-SCNC: 105 MMOL/L (ref 98–107)
CO2 SERPL-SCNC: 26 MMOL/L (ref 22–31)
CREAT SERPL-MCNC: 0.44 MG/DL (ref 0.7–1.3)
GFR SERPL CREATININE-BSD FRML MDRD: >60 ML/MIN/1.73M2
GLUCOSE BLD-MCNC: 95 MG/DL (ref 70–125)
POTASSIUM BLD-SCNC: 4.4 MMOL/L (ref 3.5–5)
SODIUM SERPL-SCNC: 140 MMOL/L (ref 136–145)

## 2021-05-15 ENCOUNTER — RECORDS - HEALTHEAST (OUTPATIENT)
Dept: LAB | Facility: CLINIC | Age: 51
End: 2021-05-15

## 2021-05-18 LAB
ANION GAP SERPL CALCULATED.3IONS-SCNC: 12 MMOL/L (ref 5–18)
BUN SERPL-MCNC: 19 MG/DL (ref 8–22)
CALCIUM SERPL-MCNC: 9.5 MG/DL (ref 8.5–10.5)
CHLORIDE BLD-SCNC: 103 MMOL/L (ref 98–107)
CO2 SERPL-SCNC: 26 MMOL/L (ref 22–31)
CREAT SERPL-MCNC: 0.53 MG/DL (ref 0.7–1.3)
GFR SERPL CREATININE-BSD FRML MDRD: >60 ML/MIN/1.73M2
GLUCOSE BLD-MCNC: 69 MG/DL (ref 70–125)
POTASSIUM BLD-SCNC: 4 MMOL/L (ref 3.5–5)
SODIUM SERPL-SCNC: 141 MMOL/L (ref 136–145)

## 2021-07-20 ENCOUNTER — LAB REQUISITION (OUTPATIENT)
Dept: LAB | Facility: CLINIC | Age: 51
End: 2021-07-20
Payer: COMMERCIAL

## 2021-07-20 DIAGNOSIS — D64.9 ANEMIA, UNSPECIFIED: ICD-10-CM

## 2021-07-20 DIAGNOSIS — I10 ESSENTIAL (PRIMARY) HYPERTENSION: ICD-10-CM

## 2021-07-22 LAB
ALBUMIN SERPL-MCNC: 3.7 G/DL (ref 3.5–5)
ALP SERPL-CCNC: 88 U/L (ref 45–120)
ALT SERPL W P-5'-P-CCNC: 19 U/L (ref 0–45)
ANION GAP SERPL CALCULATED.3IONS-SCNC: 11 MMOL/L (ref 5–18)
AST SERPL W P-5'-P-CCNC: 16 U/L (ref 0–40)
BILIRUB DIRECT SERPL-MCNC: <0.1 MG/DL
BILIRUB SERPL-MCNC: 0.1 MG/DL (ref 0–1)
BUN SERPL-MCNC: 19 MG/DL (ref 8–22)
CALCIUM SERPL-MCNC: 9.7 MG/DL (ref 8.5–10.5)
CHLORIDE BLD-SCNC: 101 MMOL/L (ref 98–107)
CO2 SERPL-SCNC: 27 MMOL/L (ref 22–31)
CREAT SERPL-MCNC: 0.5 MG/DL (ref 0.7–1.3)
ERYTHROCYTE [DISTWIDTH] IN BLOOD BY AUTOMATED COUNT: 16.4 % (ref 10–15)
GFR SERPL CREATININE-BSD FRML MDRD: >90 ML/MIN/1.73M2
GLUCOSE BLD-MCNC: 107 MG/DL (ref 70–125)
HCT VFR BLD AUTO: 40.8 % (ref 40–53)
HGB BLD-MCNC: 13.2 G/DL (ref 13.3–17.7)
MCH RBC QN AUTO: 26.7 PG (ref 26.5–33)
MCHC RBC AUTO-ENTMCNC: 32.4 G/DL (ref 31.5–36.5)
MCV RBC AUTO: 82 FL (ref 78–100)
PLATELET # BLD AUTO: 270 10E3/UL (ref 150–450)
POTASSIUM BLD-SCNC: 4 MMOL/L (ref 3.5–5)
PROT SERPL-MCNC: 7.6 G/DL (ref 6–8)
RBC # BLD AUTO: 4.95 10E6/UL (ref 4.4–5.9)
SODIUM SERPL-SCNC: 139 MMOL/L (ref 136–145)
WBC # BLD AUTO: 4.5 10E3/UL (ref 4–11)

## 2021-07-22 PROCEDURE — P9604 ONE-WAY ALLOW PRORATED TRIP: HCPCS | Mod: ORL | Performed by: NURSE PRACTITIONER

## 2021-07-22 PROCEDURE — 85027 COMPLETE CBC AUTOMATED: CPT | Mod: ORL | Performed by: NURSE PRACTITIONER

## 2021-07-22 PROCEDURE — 36415 COLL VENOUS BLD VENIPUNCTURE: CPT | Mod: ORL | Performed by: NURSE PRACTITIONER

## 2021-07-22 PROCEDURE — 82248 BILIRUBIN DIRECT: CPT | Mod: ORL | Performed by: NURSE PRACTITIONER

## 2021-07-22 PROCEDURE — 80053 COMPREHEN METABOLIC PANEL: CPT | Mod: ORL | Performed by: NURSE PRACTITIONER
